# Patient Record
Sex: MALE | Race: WHITE | NOT HISPANIC OR LATINO | Employment: FULL TIME | URBAN - NONMETROPOLITAN AREA
[De-identification: names, ages, dates, MRNs, and addresses within clinical notes are randomized per-mention and may not be internally consistent; named-entity substitution may affect disease eponyms.]

---

## 2017-04-10 RX ORDER — LANSOPRAZOLE 30 MG/1
CAPSULE, DELAYED RELEASE ORAL
Qty: 90 CAPSULE | Refills: 2 | OUTPATIENT
Start: 2017-04-10

## 2017-04-18 ENCOUNTER — OFFICE VISIT (OUTPATIENT)
Dept: CARDIOLOGY | Facility: CLINIC | Age: 52
End: 2017-04-18

## 2017-04-18 VITALS
HEIGHT: 67 IN | SYSTOLIC BLOOD PRESSURE: 120 MMHG | BODY MASS INDEX: 26.06 KG/M2 | DIASTOLIC BLOOD PRESSURE: 76 MMHG | WEIGHT: 166 LBS | HEART RATE: 76 BPM

## 2017-04-18 DIAGNOSIS — Z82.49 FAMILY HISTORY OF HEART DISEASE: ICD-10-CM

## 2017-04-18 DIAGNOSIS — I10 ESSENTIAL HYPERTENSION: ICD-10-CM

## 2017-04-18 DIAGNOSIS — E78.00 HYPERCHOLESTEREMIA: Primary | ICD-10-CM

## 2017-04-18 PROBLEM — K21.9 GERD (GASTROESOPHAGEAL REFLUX DISEASE): Status: ACTIVE | Noted: 2017-04-18

## 2017-04-18 PROCEDURE — 99213 OFFICE O/P EST LOW 20 MIN: CPT | Performed by: NURSE PRACTITIONER

## 2017-04-18 RX ORDER — LOVASTATIN 20 MG/1
20 TABLET ORAL
Qty: 90 TABLET | Refills: 3 | Status: SHIPPED | OUTPATIENT
Start: 2017-04-18 | End: 2017-10-23 | Stop reason: DRUGHIGH

## 2017-04-18 RX ORDER — MULTIPLE VITAMINS W/ MINERALS TAB 9MG-400MCG
1 TAB ORAL DAILY
COMMUNITY

## 2017-04-18 NOTE — PROGRESS NOTES
Chief Complaint   Patient presents with   • Follow-up     6 month follow-up, labs per Dr. Tom, patient brought copy of most recent labs with visit.       Cardiac Complaints  none      Subjective   Bhupendra Rothman is a 51 y.o. male with a history of hypercholesterolemia and strong family history of ischemic heart disease. His stress test in 2015 showed mild septal ischemia and Imdur was added. In April of 2016, he reported more chest tightness with exertion. A repeat stress test did not show definite ischemia. His echocardiogram showed some apical septal hypokinesis and a normal ejection fraction. Today he returns to the office for a follow-up appointment and no complaints are voiced.  Pt reports labs with PCP, most recent brought with him shows liver enzymes well controlled and cholesterol very elevated.  His most recent LDL is 214, he has been intolerant to most statin therapy.  He remains off cigarettes and is now using electronic cigarettes without nicotine.        Cardiac History  Past Surgical History:   Procedure Laterality Date   • CARDIOVASCULAR STRESS TEST  11/16/2012    (Dr. GRANADOS) 5 min 54 sec. 90% THR. Negative   • CARDIOVASCULAR STRESS TEST  02/09/2015    8 min 59 sec, 87% THR, 150/80, septal ischemia, imdur   • CARDIOVASCULAR STRESS TEST  04/06/2016    8 min 31 sec, 164/80, 89% THR, no definite ischemia   • ECHO - CONVERTED  11/14/2012    (Dr. GRANADOS) EF 65%. RVSP 64 mmHg   • ECHO - CONVERTED  02/09/2015    EF 55-60%, apical septal hypokinetic, mild MR, RVSP 43 mmHg   • ECHO - CONVERTED  04/06/2016    EF 55-60%, RVSP 23 mmHg, trace MR, apical septal wall hypokinetic       Current Outpatient Prescriptions   Medication Sig Dispense Refill   • aspirin 81 MG EC tablet Take 81 mg by mouth daily.     • lansoprazole (PREVACID) 30 MG capsule Take 30 mg by mouth daily.     • Loratadine-Pseudoephedrine (CLARITIN-D 24 HOUR PO) Take 1 tablet by mouth Daily As Needed.     • Multiple Vitamins-Minerals (MULTIVITAMIN WITH  "MINERALS) tablet tablet Take 1 tablet by mouth Daily.     • Omega-3 Fatty Acids (FISH OIL) 1200 MG capsule capsule Take 1,200 mg by mouth 2 (two) times a day with meals.     • lovastatin (MEVACOR) 20 MG tablet Take 1 tablet by mouth Daily With Dinner. 90 tablet 3     No current facility-administered medications for this visit.        Crestor [rosuvastatin calcium]    Past Medical History:   Diagnosis Date   • GERD (gastroesophageal reflux disease)    • Hypercholesterolemia    • Hyperlipidemia    • Inactive TB     as a child       Social History     Social History   • Marital status:      Spouse name: N/A   • Number of children: N/A   • Years of education: N/A     Occupational History   • Not on file.     Social History Main Topics   • Smoking status: Former Smoker     Packs/day: 1.50     Years: 30.00     Types: Electronic Cigarette     Quit date: 2013   • Smokeless tobacco: Never Used      Comment: e-cig with no nicotine   • Alcohol use Yes      Comment: 3-4 beers/day   • Drug use: No   • Sexual activity: Not on file     Other Topics Concern   • Not on file     Social History Narrative       Family History   Problem Relation Age of Onset   • Heart disease Mother      PPM/ICD, CABG at age 67, stenting 2 years later   • Heart disease Father      valve replacement   • No Known Problems Sister        Review of Systems   Constitutional: Negative.    HENT: Negative.    Respiratory: Negative.    Cardiovascular: Negative.    Musculoskeletal: Negative.    Skin: Negative.    Neurological: Negative.    Psychiatric/Behavioral: Negative.        DiabetesNo  Thyroidnormal    Objective     /76 (BP Location: Left arm)  Pulse 76  Ht 67\" (170.2 cm)  Wt 166 lb (75.3 kg)  BMI 26 kg/m2    Physical Exam   Constitutional: He is oriented to person, place, and time. He appears well-developed and well-nourished.   HENT:   Head: Normocephalic and atraumatic.   Eyes: EOM are normal. Pupils are equal, round, and reactive to " light.   Neck: Normal range of motion. Neck supple.   Cardiovascular: Normal rate and regular rhythm.    Murmur heard.  Pulmonary/Chest: Effort normal and breath sounds normal.   Abdominal: Soft.   Musculoskeletal: Normal range of motion.   Neurological: He is alert and oriented to person, place, and time.   Skin: Skin is warm and dry.   Psychiatric: He has a normal mood and affect.       Procedures    Assessment/Plan     HR and BP are both stable today.  We will advise to add mevacor to regimen since his most recent LDL of 214 in March.  He was encouraged to have repeat workup with labs 2 months after addition of medication.  No other refills needed.  No new cardiac testing will be advised as no symptoms reported.  Patient encouraged to call with any medication side effects.  Good cardiac diet and activity as tolerated advised.  6 month follow up advised or sooner if needed.    Problems Addressed this Visit        Cardiovascular and Mediastinum    High blood pressure    Hypercholesteremia - Primary    Relevant Medications    lovastatin (MEVACOR) 20 MG tablet    Other Relevant Orders    Comprehensive Metabolic Panel    Lipid Panel       Other    Family history of heart disease                  Electronically signed by ESPERANZA Jaramillo April 18, 2017 2:05 PM

## 2017-10-18 ENCOUNTER — OFFICE VISIT (OUTPATIENT)
Dept: CARDIOLOGY | Facility: CLINIC | Age: 52
End: 2017-10-18

## 2017-10-18 VITALS
DIASTOLIC BLOOD PRESSURE: 84 MMHG | SYSTOLIC BLOOD PRESSURE: 118 MMHG | OXYGEN SATURATION: 97 % | BODY MASS INDEX: 25.01 KG/M2 | HEIGHT: 68 IN | WEIGHT: 165 LBS | HEART RATE: 68 BPM

## 2017-10-18 DIAGNOSIS — Z82.49 FAMILY HISTORY OF HEART DISEASE: ICD-10-CM

## 2017-10-18 DIAGNOSIS — E78.2 MIXED HYPERLIPIDEMIA: ICD-10-CM

## 2017-10-18 DIAGNOSIS — Z79.899 MEDICATION MANAGEMENT: ICD-10-CM

## 2017-10-18 DIAGNOSIS — R73.9 HYPERGLYCEMIA: ICD-10-CM

## 2017-10-18 DIAGNOSIS — E78.00 HYPERCHOLESTEREMIA: ICD-10-CM

## 2017-10-18 DIAGNOSIS — I10 ESSENTIAL HYPERTENSION: Primary | ICD-10-CM

## 2017-10-18 PROCEDURE — 99213 OFFICE O/P EST LOW 20 MIN: CPT | Performed by: NURSE PRACTITIONER

## 2017-10-18 NOTE — PROGRESS NOTES
Chief Complaint   Patient presents with   • Follow-up     Faor cardiac management   • Med Refill     No medication refills at this time...Labs not done       Subjective       Bhupendra Rothman is a 52 y.o. male with a history of hypercholesterolemia and strong family history of ischemic heart disease. His stress test in 2015 showed mild septal ischemia and Imdur was added. In April of 2016, he reported more chest tightness with exertion. A repeat stress test did not show definite ischemia. His echocardiogram showed some apical septal hypokinesis and a normal ejection fraction. He has been intolerant to most statin therapy. At his last office visit Mevacor was recommended.   Today he comes to the office for a follow-up appointment and no cardiac complaints are voiced.  He states he maintains a lot of activity without any significant symptoms.  He is taking Mevacor without significant side effects.    HPI         Cardiac History:    Past Surgical History:   Procedure Laterality Date   • CARDIOVASCULAR STRESS TEST  11/16/2012    (Dr. GRANADOS) 5 min 54 sec. 90% THR. Negative   • CARDIOVASCULAR STRESS TEST  02/09/2015    8 min 59 sec, 87% THR, 150/80, septal ischemia, imdur   • CARDIOVASCULAR STRESS TEST  04/06/2016    8 min 31 sec, 164/80, 89% THR, no definite ischemia   • ECHO - CONVERTED  11/14/2012    (Dr. GRANADOS) EF 65%. RVSP 64 mmHg   • ECHO - CONVERTED  02/09/2015    EF 55-60%, apical septal hypokinetic, mild MR, RVSP 43 mmHg   • ECHO - CONVERTED  04/06/2016    EF 55-60%, RVSP 23 mmHg, trace MR, apical septal wall hypokinetic       Current Outpatient Prescriptions   Medication Sig Dispense Refill   • aspirin 81 MG EC tablet Take 81 mg by mouth daily.     • lansoprazole (PREVACID) 30 MG capsule Take 30 mg by mouth daily.     • Loratadine-Pseudoephedrine (CLARITIN-D 24 HOUR PO) Take 1 tablet by mouth Daily As Needed.     • lovastatin (MEVACOR) 20 MG tablet Take 1 tablet by mouth Daily With Dinner. 90 tablet 3   • Multiple  Vitamins-Minerals (MULTIVITAMIN WITH MINERALS) tablet tablet Take 1 tablet by mouth Daily.     • Omega-3 Fatty Acids (FISH OIL) 1200 MG capsule capsule Take 1,200 mg by mouth 2 (two) times a day with meals.       No current facility-administered medications for this visit.        Crestor [rosuvastatin calcium]    Past Medical History:   Diagnosis Date   • GERD (gastroesophageal reflux disease)    • Hypercholesterolemia    • Hyperlipidemia    • Inactive TB     as a child       Social History     Social History   • Marital status:      Spouse name: N/A   • Number of children: N/A   • Years of education: N/A     Occupational History   • Not on file.     Social History Main Topics   • Smoking status: Former Smoker     Packs/day: 1.50     Years: 30.00     Types: Electronic Cigarette     Quit date: 2013   • Smokeless tobacco: Never Used      Comment: e-cig with no nicotine   • Alcohol use Yes      Comment: 3-4 beers/day   • Drug use: No   • Sexual activity: Not on file     Other Topics Concern   • Not on file     Social History Narrative       Family History   Problem Relation Age of Onset   • Heart disease Mother      PPM/ICD, CABG at age 67, stenting 2 years later   • Heart disease Father      valve replacement   • No Known Problems Sister        Review of Systems   Constitution: Negative for decreased appetite and malaise/fatigue.   HENT: Negative for congestion and sore throat.    Eyes: Negative for blurred vision.   Cardiovascular: Negative for chest pain, claudication, irregular heartbeat, leg swelling, orthopnea, palpitations and paroxysmal nocturnal dyspnea.   Respiratory: Negative for shortness of breath and snoring.    Endocrine: Negative for cold intolerance and heat intolerance.   Hematologic/Lymphatic: Negative for adenopathy. Does not bruise/bleed easily.   Skin: Negative for itching, nail changes and skin cancer.   Musculoskeletal: Negative for arthritis, muscle cramps and myalgias.  "  Gastrointestinal: Negative for abdominal pain, dysphagia, heartburn and melena.   Genitourinary: Negative for frequency and hematuria.   Neurological: Negative for dizziness, headaches, light-headedness, seizures and vertigo.   Psychiatric/Behavioral: Negative for altered mental status. The patient does not have insomnia and is not nervous/anxious.    Allergic/Immunologic: Negative for environmental allergies and hives.        Diabetes- No  Thyroid-normal    Objective     /84 (BP Location: Left arm, Patient Position: Sitting, Cuff Size: Adult)  Pulse 68  Ht 68\" (172.7 cm)  Wt 165 lb (74.8 kg)  SpO2 97%  BMI 25.09 kg/m2    Physical Exam   Constitutional: He is oriented to person, place, and time. He appears well-nourished.   HENT:   Head: Normocephalic.   Eyes: Conjunctivae are normal. Pupils are equal, round, and reactive to light.   Neck: Normal range of motion. Neck supple. Carotid bruit is not present.   Cardiovascular: Normal rate, regular rhythm, S1 normal and S2 normal.    No murmur heard.  Pulmonary/Chest: Breath sounds normal.   Abdominal: Soft. Bowel sounds are normal.   Musculoskeletal: Normal range of motion. He exhibits no edema.   Neurological: He is alert and oriented to person, place, and time.   Skin: Skin is warm and dry.   Psychiatric: He has a normal mood and affect. His behavior is normal. Judgment and thought content normal.      Procedures        Assessment/Plan      Bhupendra was seen today for follow-up and med refill.    Diagnoses and all orders for this visit:    Essential hypertension  -     CBC (No Diff); Future    Hypercholesteremia    Mixed hyperlipidemia    Family history of heart disease  -     Lipid Panel; Future    Medication management  -     Comprehensive Metabolic Panel; Future  -     Lipid Panel; Future  -     CBC (No Diff); Future    Hyperglycemia  -     Lipid Panel; Future  -     Hemoglobin A1c; Future      June lab was reviewed which showed mild increase in glucose " and triglycerides.  His LDL had improved to 146.  Since he has had side effects with higher dosages of statin medication in the past, I did not change Mevacor at this time.  I encouraged him on diabetic low-fat diet.  We will reassess lipid panel, chemistry, blood count and obtain hemoglobin A1c for which a lab order was given.  I have requested a copy also be sent to you.  His blood pressure and heart rate are at goal.  No medication changes made at this time.  We will see him back in 6 months or sooner for problems.             Electronically signed by ESPERANZA Castillo,  October 18, 2017 2:37 PM

## 2017-10-23 ENCOUNTER — TELEPHONE (OUTPATIENT)
Dept: CARDIOLOGY | Facility: CLINIC | Age: 52
End: 2017-10-23

## 2017-10-23 RX ORDER — LOVASTATIN 40 MG/1
40 TABLET ORAL
Qty: 90 TABLET | Refills: 3 | Status: SHIPPED | OUTPATIENT
Start: 2017-10-23 | End: 2019-04-25 | Stop reason: SDUPTHER

## 2017-10-23 NOTE — TELEPHONE ENCOUNTER
----- Message from ESPERANZA Blanchard sent at 10/23/2017 10:31 AM EDT -----  Hs LDL has improved from 146 to 134. He has had issues with statins in the past. Would recommend increasing dose of Mevacor to 40 mg daily. He can try adding Co enzyme Q 10 200 mg daily to see if help prevent myalgia. His triglycerides are also elevated being 263. Try low triglyceride diet and using coconut oil first, if remains elevated will consider fibrate. Thanks

## 2017-10-23 NOTE — TELEPHONE ENCOUNTER
Patient notified of lab results and recommendation's: see Result note. Mevacor 40mg daily 90 day refill's sent into pharmacy.

## 2017-12-11 ENCOUNTER — TELEPHONE (OUTPATIENT)
Dept: CARDIOLOGY | Facility: CLINIC | Age: 52
End: 2017-12-11

## 2017-12-11 NOTE — TELEPHONE ENCOUNTER
Recommend add low dose Lisinopril 10 mg daily. Also script for nitrostat prn chest pain. Recheck BP in couple weeks. Advise to call sooner for problems.

## 2017-12-11 NOTE — TELEPHONE ENCOUNTER
Pt came by asking for BP to be checked.  Stated he had an episode this morning of back pain  That radiated thru to his chest, SOB noted. BP at the time was 170 /110.  Symptoms resolved after taking 2-3 ASA's 81 mg .  Pt was completely asymptomatic when he arrived here.  Was just concerned his BP had been elevated.  BP here was 140/96. Advised him to go to ER if symptoms reoccur. Last stress and echo 4/6/16.

## 2017-12-12 RX ORDER — NITROGLYCERIN 0.4 MG/1
TABLET SUBLINGUAL
Qty: 25 TABLET | Refills: 2 | Status: SHIPPED | OUTPATIENT
Start: 2017-12-12

## 2017-12-12 RX ORDER — LISINOPRIL 10 MG/1
10 TABLET ORAL DAILY
Qty: 90 TABLET | Refills: 3 | Status: SHIPPED | OUTPATIENT
Start: 2017-12-12 | End: 2018-04-19

## 2017-12-12 NOTE — TELEPHONE ENCOUNTER
Spoke with pt, states BP went down to normal yesterday, is wanting to monitor BP closely for a few days before starting the Lisinopril. Pt aware that Lisinopril and Nitro will be called into the pharmacy. Will  nitro and use with any more symptoms, wants to hold off on picking up the Lisinopril, will call with progress report in a few days.

## 2017-12-14 NOTE — TELEPHONE ENCOUNTER
Pt called back, decided to start the Lisinopril 10 mg daily. BP running 120's -130/80's. Doing good. Scheduled a BP check for Jan 4th, pt aware.

## 2018-01-04 ENCOUNTER — TELEPHONE (OUTPATIENT)
Dept: CARDIOLOGY | Facility: CLINIC | Age: 53
End: 2018-01-04

## 2018-01-04 ENCOUNTER — CLINICAL SUPPORT (OUTPATIENT)
Dept: CARDIOLOGY | Facility: CLINIC | Age: 53
End: 2018-01-04

## 2018-01-04 VITALS — SYSTOLIC BLOOD PRESSURE: 116 MMHG | DIASTOLIC BLOOD PRESSURE: 86 MMHG | HEART RATE: 98 BPM

## 2018-01-04 DIAGNOSIS — Z01.30 BLOOD PRESSURE CHECK: Primary | ICD-10-CM

## 2018-01-04 NOTE — TELEPHONE ENCOUNTER
Patient was in this morning for BP check. Lisinopril 10 mg was started on 12/14/17.    /86  HR 98.  Stated he was doing fine with the Lisinopril, an occasional cough that lasts about 30 seconds.

## 2018-04-19 ENCOUNTER — OFFICE VISIT (OUTPATIENT)
Dept: CARDIOLOGY | Facility: CLINIC | Age: 53
End: 2018-04-19

## 2018-04-19 VITALS
BODY MASS INDEX: 25.31 KG/M2 | DIASTOLIC BLOOD PRESSURE: 90 MMHG | HEART RATE: 72 BPM | WEIGHT: 167 LBS | SYSTOLIC BLOOD PRESSURE: 120 MMHG | HEIGHT: 68 IN

## 2018-04-19 DIAGNOSIS — Z87.891 FORMER SMOKER, STOPPED SMOKING IN DISTANT PAST: ICD-10-CM

## 2018-04-19 DIAGNOSIS — E78.00 HYPERCHOLESTEREMIA: ICD-10-CM

## 2018-04-19 DIAGNOSIS — I10 ESSENTIAL HYPERTENSION: Primary | ICD-10-CM

## 2018-04-19 DIAGNOSIS — Z82.49 FAMILY HISTORY OF HEART DISEASE: ICD-10-CM

## 2018-04-19 DIAGNOSIS — Z79.899 MEDICATION MANAGEMENT: ICD-10-CM

## 2018-04-19 DIAGNOSIS — R05.9 COUGH: ICD-10-CM

## 2018-04-19 PROCEDURE — 99213 OFFICE O/P EST LOW 20 MIN: CPT | Performed by: NURSE PRACTITIONER

## 2018-04-19 RX ORDER — LOSARTAN POTASSIUM 25 MG/1
25 TABLET ORAL DAILY
Qty: 90 TABLET | Refills: 3 | Status: SHIPPED | OUTPATIENT
Start: 2018-04-19 | End: 2018-10-25 | Stop reason: SDUPTHER

## 2018-04-19 NOTE — PROGRESS NOTES
Chief Complaint   Patient presents with   • Follow-up     cardiac management, patient brought copy of most recent labs with visit.    • Med Refill     Patient want's to change b/p medication Lisinopril due to cough.        Subjective       Bhupendra Rothman is a 52 y.o. male with a history of hypercholesterolemia and strong family history of ischemic heart disease. His stress test in 2015 showed mild septal ischemia and Imdur was added. In April of 2016, he reported more chest tightness with exertion. A repeat stress test did not show definite ischemia. His echocardiogram showed some apical septal hypokinesis and a normal ejection fraction. He has been intolerant to most statin therapy. Mevacor was last statin recommended and he tolerated low dose, therefore the dose was increase in 2017. In December 2017, he called the office to report episode of increased blood pressure. Lisinopril was added.   Today he comes to the office for a follow up visit. He denies angina type chest pain or palpitations. He states he maintain strenuous activities with appropriate shortness of breat. At times he has chest soreness. He is concerned over persistent dry cough.     HPI     Cardiac History:    Past Surgical History:   Procedure Laterality Date   • CARDIOVASCULAR STRESS TEST  11/16/2012    (Dr. GRANADOS) 5 min 54 sec. 90% THR. Negative   • CARDIOVASCULAR STRESS TEST  02/09/2015    8 min 59 sec, 87% THR, 150/80, septal ischemia, imdur   • CARDIOVASCULAR STRESS TEST  04/06/2016    8 min 31 sec, 164/80, 89% THR, no definite ischemia   • ECHO - CONVERTED  11/14/2012    (Dr. GRANADOS) EF 65%. RVSP 64 mmHg   • ECHO - CONVERTED  02/09/2015    EF 55-60%, apical septal hypokinetic, mild MR, RVSP 43 mmHg   • ECHO - CONVERTED  04/06/2016    EF 55-60%, RVSP 23 mmHg, trace MR, apical septal wall hypokinetic       Current Outpatient Prescriptions   Medication Sig Dispense Refill   • aspirin 81 MG EC tablet Take 81 mg by mouth daily.     • lansoprazole  (PREVACID) 30 MG capsule Take 30 mg by mouth daily.     • Loratadine-Pseudoephedrine (CLARITIN-D 24 HOUR PO) Take 1 tablet by mouth Daily As Needed.     • lovastatin (MEVACOR) 40 MG tablet Take 1 tablet by mouth Daily With Dinner. (Patient taking differently: Takes 2 tablet's by mouth every evening.) 90 tablet 3   • Multiple Vitamins-Minerals (MULTIVITAMIN WITH MINERALS) tablet tablet Take 1 tablet by mouth Daily.     • nitroglycerin (NITROSTAT) 0.4 MG SL tablet 1 under the tongue as needed for angina, may repeat q5mins for up three doses 25 tablet 2   • Omega-3 Fatty Acids (FISH OIL) 1200 MG capsule capsule Take 1,200 mg by mouth 2 (two) times a day with meals.     • losartan (COZAAR) 25 MG tablet Take 1 tablet by mouth Daily. 90 tablet 3     No current facility-administered medications for this visit.        Crestor [rosuvastatin calcium]    Past Medical History:   Diagnosis Date   • GERD (gastroesophageal reflux disease)    • Hypercholesterolemia    • Hyperlipidemia    • Inactive TB     as a child       Social History     Social History   • Marital status:      Spouse name: N/A   • Number of children: N/A   • Years of education: N/A     Occupational History   • Not on file.     Social History Main Topics   • Smoking status: Former Smoker     Packs/day: 1.50     Years: 30.00     Types: Electronic Cigarette     Quit date: 2013   • Smokeless tobacco: Never Used      Comment: e-cig with no nicotine   • Alcohol use Yes      Comment: 3-4 beers/day   • Drug use: No   • Sexual activity: Defer     Other Topics Concern   • Not on file     Social History Narrative   • No narrative on file       Family History   Problem Relation Age of Onset   • Heart disease Mother      PPM/ICD, CABG at age 67, stenting 2 years later   • Heart disease Father      valve replacement   • No Known Problems Sister        Review of Systems   Constitution: Negative for decreased appetite and malaise/fatigue.   HENT: Negative for congestion,  "hoarse voice, nosebleeds and sore throat.    Eyes: Negative for blurred vision.   Cardiovascular: Negative for chest pain, dyspnea on exertion, irregular heartbeat, leg swelling, near-syncope and palpitations.   Respiratory: Positive for cough. Negative for shortness of breath and snoring.    Endocrine: Negative for cold intolerance and heat intolerance.   Hematologic/Lymphatic: Negative for adenopathy. Does not bruise/bleed easily.   Skin: Negative for color change, dry skin and itching.   Musculoskeletal: Negative for joint pain, muscle cramps and myalgias.   Gastrointestinal: Negative for abdominal pain, change in bowel habit, dysphagia, heartburn, melena and nausea.   Genitourinary: Negative for dysuria and hematuria.   Neurological: Negative for dizziness and light-headedness.   Psychiatric/Behavioral: Negative for altered mental status. The patient does not have insomnia.    Allergic/Immunologic: Negative for environmental allergies and hives.        Objective     /90 (BP Location: Left arm)   Pulse 72   Ht 172.7 cm (68\")   Wt 75.8 kg (167 lb)   BMI 25.39 kg/m²     Physical Exam   Constitutional: He is oriented to person, place, and time. He appears well-nourished.   HENT:   Head: Normocephalic.   Eyes: Pupils are equal, round, and reactive to light.   Neck: Normal range of motion.   Cardiovascular: Normal rate, regular rhythm, S1 normal and S2 normal.    No murmur heard.  Pulmonary/Chest: Breath sounds normal.   Abdominal: Soft. Bowel sounds are normal.   Musculoskeletal: Normal range of motion.   Neurological: He is alert and oriented to person, place, and time.   Skin: Skin is warm.   Psychiatric: He has a normal mood and affect.        Procedures: none today        Assessment/Plan      Bhupendra was seen today for follow-up and med refill.    Diagnoses and all orders for this visit:    Essential hypertension    Hypercholesteremia    Family history of heart disease    Cough    Medication " management    Former smoker, stopped smoking in distant past    Other orders  -     losartan (COZAAR) 25 MG tablet; Take 1 tablet by mouth Daily.    His blood pressure is better controlled. Due to cough advised to change Lisinopril to Losartan. BP monitor sheet given. Hypertension handout also given. Body mass index is 25.39 kg/m². Patient's Body mass index is 25.39 kg/m². BMI is slightly above normal parameters. Follow-up plan includes:  nutrition counseling. Heart healthy diet encouraged. Recent lab report shows normal LFT, , triglycerides 234, HDL 54 and - improved with high dose Mevacor. He appears to be tolerating current dose of statin without problems and advised to continue the same. He follows with you for management of labs. He reports nitrostat is up to date. Information how to use for chest pain was given to him.   We will plan to see him back in 6 months, sooner for problems.              Electronically signed by ESPERANZA Castillo,  April 20, 2018 2:09 PM

## 2018-04-19 NOTE — PATIENT INSTRUCTIONS
Nitroglycerin sublingual tablets  What is this medicine?  NITROGLYCERIN (kun troe GLI ser in) is a type of vasodilator. It relaxes blood vessels, increasing the blood and oxygen supply to your heart. This medicine is used to relieve chest pain caused by angina. It is also used to prevent chest pain before activities like climbing stairs, going outdoors in cold weather, or sexual activity.  This medicine may be used for other purposes; ask your health care provider or pharmacist if you have questions.  COMMON BRAND NAME(S): Nitroquick, Nitrostat, Nitrotab  What should I tell my health care provider before I take this medicine?  They need to know if you have any of these conditions:  -anemia  -head injury, recent stroke, or bleeding in the brain  -liver disease  -previous heart attack  -an unusual or allergic reaction to nitroglycerin, other medicines, foods, dyes, or preservatives  -pregnant or trying to get pregnant  -breast-feeding  How should I use this medicine?  Take this medicine by mouth as needed. At the first sign of an angina attack (chest pain or tightness) place one tablet under your tongue. You can also take this medicine 5 to 10 minutes before an event likely to produce chest pain. Follow the directions on the prescription label. Let the tablet dissolve under the tongue. Do not swallow whole. Replace the dose if you accidentally swallow it. It will help if your mouth is not dry. Saliva around the tablet will help it to dissolve more quickly. Do not eat or drink, smoke or chew tobacco while a tablet is dissolving. If you are not better within 5 minutes after taking ONE dose of nitroglycerin, call 9-1-1 immediately to seek emergency medical care. Do not take more than 3 nitroglycerin tablets over 15 minutes.  If you take this medicine often to relieve symptoms of angina, your doctor or health care professional may provide you with different instructions to manage your symptoms. If symptoms do not go away  after following these instructions, it is important to call 9-1-1 immediately. Do not take more than 3 nitroglycerin tablets over 15 minutes.  Talk to your pediatrician regarding the use of this medicine in children. Special care may be needed.  Overdosage: If you think you have taken too much of this medicine contact a poison control center or emergency room at once.  NOTE: This medicine is only for you. Do not share this medicine with others.  What if I miss a dose?  This does not apply. This medicine is only used as needed.  What may interact with this medicine?  Do not take this medicine with any of the following medications:  -certain migraine medicines like ergotamine and dihydroergotamine (DHE)  -medicines used to treat erectile dysfunction like sildenafil, tadalafil, and vardenafil  -riociguat  This medicine may also interact with the following medications:  -alteplase  -aspirin  -heparin  -medicines for high blood pressure  -medicines for mental depression  -other medicines used to treat angina  -phenothiazines like chlorpromazine, mesoridazine, prochlorperazine, thioridazine  This list may not describe all possible interactions. Give your health care provider a list of all the medicines, herbs, non-prescription drugs, or dietary supplements you use. Also tell them if you smoke, drink alcohol, or use illegal drugs. Some items may interact with your medicine.  What should I watch for while using this medicine?  Tell your doctor or health care professional if you feel your medicine is no longer working.  Keep this medicine with you at all times. Sit or lie down when you take your medicine to prevent falling if you feel dizzy or faint after using it. Try to remain calm. This will help you to feel better faster. If you feel dizzy, take several deep breaths and lie down with your feet propped up, or bend forward with your head resting between your knees.  You may get drowsy or dizzy. Do not drive, use machinery,  or do anything that needs mental alertness until you know how this drug affects you. Do not stand or sit up quickly, especially if you are an older patient. This reduces the risk of dizzy or fainting spells. Alcohol can make you more drowsy and dizzy. Avoid alcoholic drinks.  Do not treat yourself for coughs, colds, or pain while you are taking this medicine without asking your doctor or health care professional for advice. Some ingredients may increase your blood pressure.  What side effects may I notice from receiving this medicine?  Side effects that you should report to your doctor or health care professional as soon as possible:  -blurred vision  -dry mouth  -skin rash  -sweating  -the feeling of extreme pressure in the head  -unusually weak or tired  Side effects that usually do not require medical attention (report to your doctor or health care professional if they continue or are bothersome):  -flushing of the face or neck  -headache  -irregular heartbeat, palpitations  -nausea, vomiting  This list may not describe all possible side effects. Call your doctor for medical advice about side effects. You may report side effects to FDA at 7-827-FDA-0297.  Where should I keep my medicine?  Keep out of the reach of children.  Store at room temperature between 20 and 25 degrees C (68 and 77 degrees F). Store in original container. Protect from light and moisture. Keep tightly closed. Throw away any unused medicine after the expiration date.  NOTE: This sheet is a summary. It may not cover all possible information. If you have questions about this medicine, talk to your doctor, pharmacist, or health care provider.  © 2018 [employer]vier/Gold Standard (2014-10-16 17:57:36)

## 2018-10-25 ENCOUNTER — OFFICE VISIT (OUTPATIENT)
Dept: CARDIOLOGY | Facility: CLINIC | Age: 53
End: 2018-10-25

## 2018-10-25 VITALS
WEIGHT: 160 LBS | BODY MASS INDEX: 24.25 KG/M2 | SYSTOLIC BLOOD PRESSURE: 110 MMHG | HEART RATE: 72 BPM | DIASTOLIC BLOOD PRESSURE: 80 MMHG | HEIGHT: 68 IN

## 2018-10-25 DIAGNOSIS — Z82.49 FAMILY HISTORY OF HEART DISEASE: ICD-10-CM

## 2018-10-25 DIAGNOSIS — I10 ESSENTIAL HYPERTENSION: Primary | ICD-10-CM

## 2018-10-25 DIAGNOSIS — E78.00 HYPERCHOLESTEREMIA: ICD-10-CM

## 2018-10-25 DIAGNOSIS — K21.9 GASTROESOPHAGEAL REFLUX DISEASE WITHOUT ESOPHAGITIS: ICD-10-CM

## 2018-10-25 PROCEDURE — 99213 OFFICE O/P EST LOW 20 MIN: CPT | Performed by: NURSE PRACTITIONER

## 2018-10-25 RX ORDER — LOSARTAN POTASSIUM 25 MG/1
25 TABLET ORAL DAILY
Qty: 90 TABLET | Refills: 3 | Status: SHIPPED | OUTPATIENT
Start: 2018-10-25 | End: 2019-03-18 | Stop reason: ALTCHOICE

## 2018-10-25 RX ORDER — FENOFIBRIC ACID 135 MG/1
135 CAPSULE, DELAYED RELEASE ORAL DAILY
COMMUNITY
End: 2019-04-25 | Stop reason: SDUPTHER

## 2018-10-25 RX ORDER — SULFAMETHOXAZOLE AND TRIMETHOPRIM 800; 160 MG/1; MG/1
1 TABLET ORAL 2 TIMES DAILY
Qty: 20 TABLET | Refills: 0 | Status: SHIPPED | OUTPATIENT
Start: 2018-10-25 | End: 2019-04-25 | Stop reason: ALTCHOICE

## 2018-10-25 NOTE — PROGRESS NOTES
Chief Complaint   Patient presents with   • Follow-up     For cardiac management. Patient is on aspirin. Last lab work was done a couple of months ago per PCP, not in chart. Triglycerides were high so Trilipix.   • Med Refill     Needs refills on losartan. 90 day supplies to [facility] in [place].        Cardiac Complaints  none      Subjective   Bhupendra Rothman is a 53 y.o. male with hypercholesterolemia, HTN,  and strong family history of ischemic heart disease. His stress test in 2015 showed mild septal ischemia and Imdur was added. In April of 2016, he reported more chest tightness with exertion. A repeat stress test did not show definite ischemia. His echocardiogram showed some apical septal hypokinesis and a normal ejection fraction. He had been statin intolerant but has tolerated Mevacor well. In December 2017, he called the office to report episode of increased blood pressure. Lisinopril was added.  He returns today for follow up and denies any cardiac concerns.  He does report with blood pressure better controlled cardiac symptoms are denied.  He does report an issue with a pimple like area on his waist he is concerned with and reports his son was battling some staph about a week ago.  Labs he admits are followed by PCP and were done a few month ago. Patient does report triglycerides as elevated and states trilipix was started at that time.  Refills of losartan therapy requested for 90 day supply.      Cardiac History  Past Surgical History:   Procedure Laterality Date   • CARDIOVASCULAR STRESS TEST  11/16/2012    (Dr. GRANADOS) 5 min 54 sec. 90% THR. Negative   • CARDIOVASCULAR STRESS TEST  02/09/2015    8 min 59 sec, 87% THR, 150/80, septal ischemia, imdur   • CARDIOVASCULAR STRESS TEST  04/06/2016    8 min 31 sec, 164/80, 89% THR, no definite ischemia   • ECHO - CONVERTED  11/14/2012    (Dr. GRANADOS) EF 65%. RVSP 64 mmHg   • ECHO - CONVERTED  02/09/2015    EF 55-60%, apical septal hypokinetic, mild MR, RVSP 43  mmHg   • ECHO - CONVERTED  04/06/2016    EF 55-60%, RVSP 23 mmHg, trace MR, apical septal wall hypokinetic       Current Outpatient Prescriptions   Medication Sig Dispense Refill   • aspirin 81 MG EC tablet Take 81 mg by mouth daily.     • fenofibric acid (TRILIPIX) 135 MG capsule delayed-release delayed release capsule Take 135 mg by mouth Daily.     • lansoprazole (PREVACID) 30 MG capsule Take 30 mg by mouth daily.     • Loratadine-Pseudoephedrine (CLARITIN-D 24 HOUR PO) Take 1 tablet by mouth Daily As Needed.     • losartan (COZAAR) 25 MG tablet Take 1 tablet by mouth Daily. 90 tablet 3   • lovastatin (MEVACOR) 40 MG tablet Take 1 tablet by mouth Daily With Dinner. (Patient taking differently: Takes 2 tablet's by mouth every evening.) 90 tablet 3   • Multiple Vitamins-Minerals (MULTIVITAMIN WITH MINERALS) tablet tablet Take 1 tablet by mouth Daily.     • nitroglycerin (NITROSTAT) 0.4 MG SL tablet 1 under the tongue as needed for angina, may repeat q5mins for up three doses 25 tablet 2   • Omega-3 Fatty Acids (FISH OIL) 1200 MG capsule capsule Take 1,200 mg by mouth 2 (two) times a day with meals.     • sulfamethoxazole-trimethoprim (BACTRIM DS) 800-160 MG per tablet Take 1 tablet by mouth 2 (Two) Times a Day. 20 tablet 0     No current facility-administered medications for this visit.        Crestor [rosuvastatin calcium]    Past Medical History:   Diagnosis Date   • GERD (gastroesophageal reflux disease)    • Hypercholesterolemia    • Hyperlipidemia    • Inactive TB     as a child       Social History     Social History   • Marital status:      Spouse name: N/A   • Number of children: N/A   • Years of education: N/A     Occupational History   • Not on file.     Social History Main Topics   • Smoking status: Former Smoker     Packs/day: 1.50     Years: 30.00     Types: Electronic Cigarette     Quit date: 2013   • Smokeless tobacco: Never Used      Comment: e-cig with no nicotine   • Alcohol use Yes       "Comment: 3-4 beers/day   • Drug use: No   • Sexual activity: Defer     Other Topics Concern   • Not on file     Social History Narrative   • No narrative on file       Family History   Problem Relation Age of Onset   • Heart disease Mother         PPM/ICD, CABG at age 67, stenting 2 years later   • Heart disease Father         valve replacement   • No Known Problems Sister        Review of Systems   Constitution: Negative for weakness and malaise/fatigue.   Cardiovascular: Negative for chest pain, claudication, dyspnea on exertion, irregular heartbeat, near-syncope, orthopnea, palpitations and syncope.   Respiratory: Negative for cough, shortness of breath and wheezing.    Musculoskeletal: Negative for back pain, joint pain and joint swelling.   Gastrointestinal: Negative for anorexia, heartburn, nausea and vomiting.   Genitourinary: Negative for dysuria, hematuria, hesitancy and nocturia.   Neurological: Negative for dizziness, light-headedness and loss of balance.   Psychiatric/Behavioral: Negative for altered mental status, depression and memory loss. The patient is not nervous/anxious.            Objective     /80 (BP Location: Left arm)   Pulse 72   Ht 172.7 cm (67.99\")   Wt 72.6 kg (160 lb)   BMI 24.33 kg/m²     Physical Exam   Constitutional: He is oriented to person, place, and time. He appears well-developed and well-nourished.   HENT:   Head: Normocephalic and atraumatic.   Eyes: Pupils are equal, round, and reactive to light. EOM are normal.   Neck: Normal range of motion. Neck supple.   Cardiovascular: Normal rate and regular rhythm.    Murmur heard.  Pulmonary/Chest: Effort normal and breath sounds normal.   Abdominal: Soft.   Musculoskeletal: Normal range of motion.   Neurological: He is alert and oriented to person, place, and time.   Skin: Skin is warm and dry.   Psychiatric: He has a normal mood and affect. His behavior is normal.       Procedures    Assessment/Plan     HR and BP are " stable today.  HTN is well managed on current regimen, no adjustment to current regimen.  Continued DASH diet encouraged.  For lipid management, he has continued on trilipix for hypertriglyceridemia and mevacor for elevated LDL.  He does admit to tolerating both well.  Labs he reports with your office.  Could we have next for review?  No new cardiac workup will be advised today as no new concerns are voiced.  If symptoms should arise, patient aware to call so further workup may be considered.  BMI stable at 24.33 and patient admits to an active lifestyle with running and walking and has been watching his carbs more than prior.  6 month follow up advised or sooner if needed.        Problems Addressed this Visit        Cardiovascular and Mediastinum    High blood pressure - Primary    Relevant Medications    losartan (COZAAR) 25 MG tablet    Hypercholesteremia    Relevant Medications    fenofibric acid (TRILIPIX) 135 MG capsule delayed-release delayed release capsule       Digestive    GERD (gastroesophageal reflux disease)       Other    Family history of heart disease          Patient's Body mass index is 24.33 kg/m². BMI is within normal parameters. No follow-up required.            Electronically signed by ESPERANZA Jaramillo October 25, 2018 2:51 PM

## 2019-03-18 ENCOUNTER — TELEPHONE (OUTPATIENT)
Dept: CARDIOLOGY | Facility: CLINIC | Age: 54
End: 2019-03-18

## 2019-03-18 RX ORDER — OLMESARTAN MEDOXOMIL 20 MG/1
10 TABLET ORAL DAILY
Qty: 15 TABLET | Refills: 11 | Status: SHIPPED | OUTPATIENT
Start: 2019-03-18 | End: 2019-03-19 | Stop reason: ALTCHOICE

## 2019-03-18 NOTE — TELEPHONE ENCOUNTER
Patient was made aware of changing from losartan to Benicar to 20 mg 1/2 tablet QD. Script sent to [facility] in [place].

## 2019-03-18 NOTE — TELEPHONE ENCOUNTER
Patient reports that he got a letter stating that his losartan 25 mg QD has been recalled and is needing to be changed. He reports that that his blood pressure has been doing good for him at home and has been wondering if he needs to continue taking anything. But states that he has been taking up until now.

## 2019-03-19 ENCOUNTER — TELEPHONE (OUTPATIENT)
Dept: CARDIOLOGY | Facility: CLINIC | Age: 54
End: 2019-03-19

## 2019-03-19 RX ORDER — CANDESARTAN 8 MG/1
8 TABLET ORAL DAILY
Qty: 30 TABLET | Refills: 11 | Status: SHIPPED | OUTPATIENT
Start: 2019-03-19 | End: 2019-03-20 | Stop reason: ALTCHOICE

## 2019-03-19 NOTE — TELEPHONE ENCOUNTER
Patient was made aware of order for candesartan 8 mg QD. Script sent to [facility] in [place] with 30 tablets and 11 refills.

## 2019-03-19 NOTE — TELEPHONE ENCOUNTER
[facility] called stating that olmesartan 20 mg 1/2 tablet QD is on back order at this time and is asking what you would recommend in its place?

## 2019-03-20 RX ORDER — VALSARTAN 40 MG/1
40 TABLET ORAL DAILY
Qty: 30 TABLET | Refills: 11 | Status: SHIPPED | OUTPATIENT
Start: 2019-03-20 | End: 2019-04-25 | Stop reason: SDUPTHER

## 2019-03-20 NOTE — TELEPHONE ENCOUNTER
Wil from [facility] called and reported that they are also currently on back order of candesartan. He reports that they do have valsartan and telmisartan. What do you recommend?

## 2019-03-20 NOTE — TELEPHONE ENCOUNTER
Patient was made aware of script for valsartan 40 mg QD. Script sent to [facility] in [place] with 30 tablets and 11 refills.

## 2019-04-25 ENCOUNTER — OFFICE VISIT (OUTPATIENT)
Dept: CARDIOLOGY | Facility: CLINIC | Age: 54
End: 2019-04-25

## 2019-04-25 VITALS
HEIGHT: 68 IN | HEART RATE: 83 BPM | DIASTOLIC BLOOD PRESSURE: 78 MMHG | BODY MASS INDEX: 24.34 KG/M2 | SYSTOLIC BLOOD PRESSURE: 122 MMHG

## 2019-04-25 DIAGNOSIS — I10 ESSENTIAL HYPERTENSION: Primary | ICD-10-CM

## 2019-04-25 DIAGNOSIS — E78.00 HYPERCHOLESTEREMIA: ICD-10-CM

## 2019-04-25 PROCEDURE — 93000 ELECTROCARDIOGRAM COMPLETE: CPT | Performed by: NURSE PRACTITIONER

## 2019-04-25 PROCEDURE — 99213 OFFICE O/P EST LOW 20 MIN: CPT | Performed by: NURSE PRACTITIONER

## 2019-04-25 RX ORDER — LOVASTATIN 40 MG/1
TABLET ORAL
Qty: 180 TABLET | Refills: 3 | Status: SHIPPED | OUTPATIENT
Start: 2019-04-25 | End: 2019-10-24 | Stop reason: ALTCHOICE

## 2019-04-25 RX ORDER — VALSARTAN 40 MG/1
40 TABLET ORAL DAILY
Qty: 90 TABLET | Refills: 2 | Status: SHIPPED | OUTPATIENT
Start: 2019-04-25 | End: 2019-10-24 | Stop reason: SDUPTHER

## 2019-04-25 RX ORDER — FENOFIBRIC ACID 135 MG/1
135 CAPSULE, DELAYED RELEASE ORAL DAILY
Qty: 90 CAPSULE | Refills: 2 | Status: SHIPPED | OUTPATIENT
Start: 2019-04-25 | End: 2019-10-24 | Stop reason: SDUPTHER

## 2019-04-25 NOTE — PROGRESS NOTES
Chief Complaint   Patient presents with   • Follow-up     cardiac management   • Aspirin     81 mg daily dose   • Med Refill     90 daysupply of Diovan,Lovastatin,Triplex,and Prevacid       Cardiac Complaints  none      Subjective   Bhupendra Rothman is a 53 y.o. male with hypercholesterolemia, HTN,  and strong family history of ischemic heart disease. His stress test in 2015 showed mild septal ischemia and Imdur was added. In April of 2016, he reported more chest tightness with exertion. A repeat stress test did not show definite ischemia. His echocardiogram showed some apical septal hypokinesis and a normal ejection fraction. He had been statin intolerant but has tolerated Mevacor well. In December 2017, he called the office to report episode of increased blood pressure. Lisinopril was added. Patient returns today for follow up and cardiac concerns are denied.  He reports a very active lifestyle at home without concerns.  Chest pain, shortness of breath, palpitations, and dizziness are denied.  He does report labs a few months ago with PCP but can not recall when they were done.  No current available for review.  Cardiac refills requested.        Cardiac History  Past Surgical History:   Procedure Laterality Date   • CARDIOVASCULAR STRESS TEST  11/16/2012    (Dr. GRANADOS) 5 min 54 sec. 90% THR. Negative   • CARDIOVASCULAR STRESS TEST  02/09/2015    8 min 59 sec, 87% THR, 150/80, septal ischemia, imdur   • CARDIOVASCULAR STRESS TEST  04/06/2016    8 min 31 sec, 164/80, 89% THR, no definite ischemia   • ECHO - CONVERTED  11/14/2012    (Dr. GRANADOS) EF 65%. RVSP 64 mmHg   • ECHO - CONVERTED  02/09/2015    EF 55-60%, apical septal hypokinetic, mild MR, RVSP 43 mmHg   • ECHO - CONVERTED  04/06/2016    EF 55-60%, RVSP 23 mmHg, trace MR, apical septal wall hypokinetic       Current Outpatient Medications   Medication Sig Dispense Refill   • aspirin 81 MG EC tablet Take 81 mg by mouth daily.     • fenofibric acid (TRILIPIX) 135 MG  capsule delayed-release delayed release capsule Take 1 capsule by mouth Daily. 90 capsule 2   • lansoprazole (PREVACID) 30 MG capsule Take 30 mg by mouth daily.     • Loratadine-Pseudoephedrine (CLARITIN-D 24 HOUR PO) Take 1 tablet by mouth Daily As Needed.     • lovastatin (MEVACOR) 40 MG tablet Takes 2 tablet's by mouth every evening. 180 tablet 3   • Multiple Vitamins-Minerals (MULTIVITAMIN WITH MINERALS) tablet tablet Take 1 tablet by mouth Daily.     • nitroglycerin (NITROSTAT) 0.4 MG SL tablet 1 under the tongue as needed for angina, may repeat q5mins for up three doses 25 tablet 2   • Omega-3 Fatty Acids (FISH OIL) 1200 MG capsule capsule Take 1,200 mg by mouth 2 (two) times a day with meals.     • valsartan (DIOVAN) 40 MG tablet Take 1 tablet by mouth Daily. 90 tablet 2     No current facility-administered medications for this visit.        Crestor [rosuvastatin calcium]    Past Medical History:   Diagnosis Date   • GERD (gastroesophageal reflux disease)    • Hypercholesterolemia    • Hyperlipidemia    • Inactive TB     as a child       Social History     Socioeconomic History   • Marital status:      Spouse name: Not on file   • Number of children: Not on file   • Years of education: Not on file   • Highest education level: Not on file   Tobacco Use   • Smoking status: Former Smoker     Packs/day: 1.50     Years: 30.00     Pack years: 45.00     Types: Electronic Cigarette     Last attempt to quit: 2013     Years since quittin.3   • Smokeless tobacco: Never Used   • Tobacco comment: e-cig with no nicotine   Substance and Sexual Activity   • Alcohol use: Yes     Comment: 3-4 beers/day   • Drug use: No   • Sexual activity: Defer       Family History   Problem Relation Age of Onset   • Heart disease Mother         PPM/ICD, CABG at age 67, stenting 2 years later   • Heart disease Father         valve replacement   • No Known Problems Sister        Review of Systems   Constitution: Negative for  "weakness and malaise/fatigue.   Cardiovascular: Negative for chest pain, claudication, dyspnea on exertion, irregular heartbeat, orthopnea and palpitations.   Respiratory: Negative for cough, shortness of breath and wheezing.    Musculoskeletal: Negative for back pain, joint pain and joint swelling.   Gastrointestinal: Negative for anorexia, heartburn, nausea and vomiting.   Genitourinary: Negative for dysuria, hematuria, hesitancy and nocturia.   Neurological: Negative for dizziness, light-headedness and loss of balance.   Psychiatric/Behavioral: Negative for depression and memory loss. The patient is not nervous/anxious.            Objective     /78 (BP Location: Left arm)   Pulse 83   Ht 172.7 cm (67.99\")   BMI 24.34 kg/m²     Physical Exam   Constitutional: He is oriented to person, place, and time. He appears well-developed and well-nourished.   HENT:   Head: Normocephalic and atraumatic.   Eyes: EOM are normal. Pupils are equal, round, and reactive to light.   Neck: Normal range of motion. Neck supple.   Cardiovascular: Normal rate and regular rhythm.   Murmur heard.  Pulmonary/Chest: Effort normal and breath sounds normal.   Abdominal: Soft.   Musculoskeletal: Normal range of motion.   Neurological: He is alert and oriented to person, place, and time.   Skin: Skin is warm and dry.   Psychiatric: He has a normal mood and affect. His behavior is normal.         ECG 12 Lead  Date/Time: 4/25/2019 11:05 AM  Performed by: Leslie Villanueva APRN  Authorized by: Leslie Villanueva APRN   Rhythm: sinus rhythm  BPM: 83    Clinical impression: normal ECG            Assessment/Plan     HR is stable today and regular.  EKG done today for history of HTN and abnormal stress in the past showed normal sinus rhythm with normal QT.  HTN well managed, with current regimen continued.  He has continued on mevacor therapy, fish oil, and trilipix therapy for mixed hyperlipidemia management and tolerates well.  No recent labs " are available to assess efficacy but he states you are following in regards.  Can we have most recent copy for our records?  For now, current continued.  No new workup recommended as no new concerns are voiced and cardiac status appears stable.  BMI noted at 24.34, continued cardiac diet and activity as tolerated advised.  6 month follow up scheduled or sooner if needed.  Refills sent per request.        Problems Addressed this Visit        Cardiovascular and Mediastinum    High blood pressure - Primary    Relevant Medications    valsartan (DIOVAN) 40 MG tablet    Other Relevant Orders    ECG 12 Lead    Hypercholesteremia    Relevant Medications    fenofibric acid (TRILIPIX) 135 MG capsule delayed-release delayed release capsule    lovastatin (MEVACOR) 40 MG tablet          Patient's Body mass index is 24.34 kg/m². BMI is within normal parameters. No follow-up required..              Electronically signed by ESPERANZA Jaramillo April 26, 2019 1:19 PM

## 2019-10-24 ENCOUNTER — TELEPHONE (OUTPATIENT)
Dept: CARDIOLOGY | Facility: CLINIC | Age: 54
End: 2019-10-24

## 2019-10-24 ENCOUNTER — OFFICE VISIT (OUTPATIENT)
Dept: CARDIOLOGY | Facility: CLINIC | Age: 54
End: 2019-10-24

## 2019-10-24 VITALS
HEART RATE: 68 BPM | WEIGHT: 169 LBS | BODY MASS INDEX: 25.61 KG/M2 | HEIGHT: 68 IN | SYSTOLIC BLOOD PRESSURE: 116 MMHG | DIASTOLIC BLOOD PRESSURE: 78 MMHG

## 2019-10-24 DIAGNOSIS — R25.2 MUSCLE CRAMP: ICD-10-CM

## 2019-10-24 DIAGNOSIS — E66.3 OVERWEIGHT: ICD-10-CM

## 2019-10-24 DIAGNOSIS — R01.1 HEART MURMUR: ICD-10-CM

## 2019-10-24 DIAGNOSIS — I10 ESSENTIAL HYPERTENSION: Primary | ICD-10-CM

## 2019-10-24 DIAGNOSIS — E78.00 HYPERCHOLESTEREMIA: ICD-10-CM

## 2019-10-24 PROCEDURE — 99213 OFFICE O/P EST LOW 20 MIN: CPT | Performed by: NURSE PRACTITIONER

## 2019-10-24 RX ORDER — VALSARTAN 40 MG/1
40 TABLET ORAL DAILY
Qty: 30 TABLET | Refills: 8 | Status: SHIPPED | OUTPATIENT
Start: 2019-10-24 | End: 2020-04-30 | Stop reason: SDUPTHER

## 2019-10-24 RX ORDER — PRAVASTATIN SODIUM 40 MG
40 TABLET ORAL DAILY
COMMUNITY
End: 2019-10-24 | Stop reason: SDUPTHER

## 2019-10-24 RX ORDER — FENOFIBRIC ACID 135 MG/1
135 CAPSULE, DELAYED RELEASE ORAL DAILY
Qty: 30 CAPSULE | Refills: 8 | Status: SHIPPED | OUTPATIENT
Start: 2019-10-24 | End: 2020-04-30 | Stop reason: SDUPTHER

## 2019-10-24 RX ORDER — PRAVASTATIN SODIUM 40 MG
40 TABLET ORAL DAILY
Qty: 30 TABLET | Refills: 8 | Status: SHIPPED | OUTPATIENT
Start: 2019-10-24 | End: 2020-04-30 | Stop reason: SDUPTHER

## 2019-10-24 NOTE — TELEPHONE ENCOUNTER
Please make sure he knows the krill oil will replace the fish oil.  Also, please let him know when he gets it to  COQ10 to help with joint pain/cramps.

## 2019-10-24 NOTE — PROGRESS NOTES
Chief Complaint   Patient presents with   • Follow-up     For cardiac management. Patient is on aspirin. Last lab work was done on 10/08/19, copy in door. Patient was changed from lovastatin to pravastatin per PCP. States that he has been having more leg cramps.    • Med Refill     Needs refills on cardiac medications, including pravastatin if he needs to stay on. 90 day supplies to [facility].       Cardiac Complaints  none      Subjective   Bhupendra Rothman is a 54 y.o. male with hypercholesterolemia, HTN,  and strong family history of ischemic heart disease. His stress test in 2015 showed mild septal ischemia and Imdur was added. In April of 2016, he reported more chest tightness with exertion. A repeat stress test did not show definite ischemia. His echocardiogram showed some apical septal hypokinesis and a normal ejection fraction. He had been statin intolerant but has tolerated Mevacor well. In December 2017, he called the office to report episode of increased blood pressure. Lisinopril was added.    He returns today for follow up and reports doing well. He denies chest pain, palpitations, dizziness, and fatigue. He does admit he had cholesterol medication change after most recent labs showed lipids not at goal. He thinks with the change he has had a bit more cramping but nothing has been limiting his ADL or activity. Most recent labs from PCP Oct 2019 show:  H/H 14.4/41.2, , BUN 11, Creatinine 1.0, K 4.4, , CA 9.7, AST 36, ALT 26, GFR 82, , TRIG 176, LDL 49, HDL 50. Refills of cardiac meds requested including pravastatin.          Cardiac History  Past Surgical History:   Procedure Laterality Date   • CARDIOVASCULAR STRESS TEST  11/16/2012    (Dr. GRANADOS) 5 min 54 sec. 90% THR. Negative   • CARDIOVASCULAR STRESS TEST  02/09/2015    8 min 59 sec, 87% THR, 150/80, septal ischemia, imdur   • CARDIOVASCULAR STRESS TEST  04/06/2016    8 min 31 sec, 164/80, 89% THR, no definite ischemia   • ECHO -  CONVERTED  2012    (Dr. GRANADOS) EF 65%. RVSP 64 mmHg   • ECHO - CONVERTED  2015    EF 55-60%, apical septal hypokinetic, mild MR, RVSP 43 mmHg   • ECHO - CONVERTED  2016    EF 55-60%, RVSP 23 mmHg, trace MR, apical septal wall hypokinetic       Current Outpatient Medications   Medication Sig Dispense Refill   • aspirin 81 MG EC tablet Take 81 mg by mouth daily.     • fenofibric acid (TRILIPIX) 135 MG capsule delayed-release delayed release capsule Take 1 capsule by mouth Daily. 30 capsule 8   • lansoprazole (PREVACID) 30 MG capsule Take 30 mg by mouth daily.     • Loratadine-Pseudoephedrine (CLARITIN-D 24 HOUR PO) Take 1 tablet by mouth Daily As Needed.     • Multiple Vitamins-Minerals (MULTIVITAMIN WITH MINERALS) tablet tablet Take 1 tablet by mouth Daily.     • nitroglycerin (NITROSTAT) 0.4 MG SL tablet 1 under the tongue as needed for angina, may repeat q5mins for up three doses 25 tablet 2   • Omega-3 Fatty Acids (FISH OIL) 1200 MG capsule capsule Take 1,200 mg by mouth 2 (two) times a day with meals.     • pravastatin (PRAVACHOL) 40 MG tablet Take 1 tablet by mouth Daily. 30 tablet 8   • valsartan (DIOVAN) 40 MG tablet Take 1 tablet by mouth Daily. 30 tablet 8     No current facility-administered medications for this visit.        Crestor [rosuvastatin calcium]    Past Medical History:   Diagnosis Date   • GERD (gastroesophageal reflux disease)    • Hypercholesterolemia    • Hyperlipidemia    • Inactive TB     as a child       Social History     Socioeconomic History   • Marital status:      Spouse name: Not on file   • Number of children: Not on file   • Years of education: Not on file   • Highest education level: Not on file   Tobacco Use   • Smoking status: Former Smoker     Packs/day: 1.50     Years: 30.00     Pack years: 45.00     Last attempt to quit:      Years since quittin.8   • Smokeless tobacco: Never Used   • Tobacco comment: Has not used e-cigarette for 6 months.  "  Substance and Sexual Activity   • Alcohol use: Yes     Comment: 3-4 beers/day   • Drug use: No   • Sexual activity: Defer       Family History   Problem Relation Age of Onset   • Heart disease Mother         PPM/ICD, CABG at age 67, stenting 2 years later   • Heart disease Father         valve replacement   • No Known Problems Sister        Review of Systems   Constitution: Negative for weakness and malaise/fatigue.   Cardiovascular: Negative for chest pain, claudication, dyspnea on exertion, irregular heartbeat, leg swelling, near-syncope, orthopnea, palpitations and syncope.   Respiratory: Negative for cough, shortness of breath and wheezing.    Musculoskeletal: Positive for muscle cramps and stiffness. Negative for back pain.   Gastrointestinal: Negative for anorexia, heartburn, nausea and vomiting.   Genitourinary: Negative for dysuria, hematuria, hesitancy and nocturia.   Neurological: Negative for dizziness, light-headedness and loss of balance.   Psychiatric/Behavioral: Negative for depression and memory loss. The patient is not nervous/anxious.            Objective     /78 (BP Location: Right arm)   Pulse 68   Ht 172.7 cm (67.99\")   Wt 76.7 kg (169 lb)   BMI 25.70 kg/m²     Physical Exam   Constitutional: He is oriented to person, place, and time. He appears well-developed and well-nourished.   HENT:   Head: Normocephalic and atraumatic.   Eyes: EOM are normal. Pupils are equal, round, and reactive to light.   Neck: Normal range of motion. Neck supple.   Cardiovascular: Normal rate and regular rhythm.   Murmur heard.  Pulmonary/Chest: Effort normal and breath sounds normal.   Abdominal: Soft.   Musculoskeletal: Normal range of motion.   Neurological: He is alert and oriented to person, place, and time.   Skin: Skin is warm and dry.   Psychiatric: He has a normal mood and affect. His behavior is normal.       Procedures    Assessment/Plan     HR is stable today with regular rhythm noted. HTN well " managed on current, same advised. Most recent labs show mixed hyperlipidemia not well managed for which medication changes were advised. He reports he is taking pravastatin but notices more leg aches, he will be urged to add COQ10 100mg OTC.  He is to have labs with you in December according to patient to assess efficacy of medication changes. Limited carbs, calories, and sugar recommended as well as well as decreasing his beer intake. No repeat cardiac workup advised as no new or worsening cardiac symptoms are reported. Refills of cardiac meds sent per request. BMI noted at 25.70, good cardiac diet with limited carbs, calories, and continued activity encouraged. 6 month follow up scheduled or sooner if needed.        Problems Addressed this Visit        Cardiovascular and Mediastinum    High blood pressure - Primary    Relevant Medications    valsartan (DIOVAN) 40 MG tablet    Hypercholesteremia    Relevant Medications    pravastatin (PRAVACHOL) 40 MG tablet    fenofibric acid (TRILIPIX) 135 MG capsule delayed-release delayed release capsule      Other Visit Diagnoses     Heart murmur        Muscle cramp        Overweight              Patient's Body mass index is 25.7 kg/m². BMI is above normal parameters. Recommendations include: nutrition counseling.                Electronically signed by ESPERANZA Jaramillo October 28, 2019 2:54 PM

## 2019-10-25 NOTE — TELEPHONE ENCOUNTER
Patient was made aware that the krill oil will replace the fish oil. Patient was also made aware to  COQ10 to see if it will help with the joint pain/cramps.

## 2020-04-30 ENCOUNTER — OFFICE VISIT (OUTPATIENT)
Dept: CARDIOLOGY | Facility: CLINIC | Age: 55
End: 2020-04-30

## 2020-04-30 VITALS
SYSTOLIC BLOOD PRESSURE: 110 MMHG | HEART RATE: 68 BPM | WEIGHT: 170.8 LBS | DIASTOLIC BLOOD PRESSURE: 70 MMHG | BODY MASS INDEX: 25.88 KG/M2 | HEIGHT: 68 IN | TEMPERATURE: 97.7 F

## 2020-04-30 DIAGNOSIS — E78.00 HYPERCHOLESTEREMIA: ICD-10-CM

## 2020-04-30 DIAGNOSIS — I10 ESSENTIAL HYPERTENSION: Primary | ICD-10-CM

## 2020-04-30 DIAGNOSIS — E66.3 OVERWEIGHT: ICD-10-CM

## 2020-04-30 PROCEDURE — 99213 OFFICE O/P EST LOW 20 MIN: CPT | Performed by: NURSE PRACTITIONER

## 2020-04-30 RX ORDER — PRAVASTATIN SODIUM 40 MG
40 TABLET ORAL DAILY
Qty: 30 TABLET | Refills: 8 | Status: SHIPPED | OUTPATIENT
Start: 2020-04-30 | End: 2020-05-04 | Stop reason: SDUPTHER

## 2020-04-30 RX ORDER — FENOFIBRIC ACID 135 MG/1
135 CAPSULE, DELAYED RELEASE ORAL DAILY
Qty: 30 CAPSULE | Refills: 8 | Status: SHIPPED | OUTPATIENT
Start: 2020-04-30 | End: 2020-05-04 | Stop reason: SDUPTHER

## 2020-04-30 RX ORDER — VALSARTAN 40 MG/1
40 TABLET ORAL DAILY
Qty: 30 TABLET | Refills: 8 | Status: SHIPPED | OUTPATIENT
Start: 2020-04-30 | End: 2020-11-10

## 2020-04-30 RX ORDER — LANSOPRAZOLE 30 MG/1
30 CAPSULE, DELAYED RELEASE ORAL DAILY
Qty: 90 CAPSULE | Refills: 2 | Status: SHIPPED | OUTPATIENT
Start: 2020-04-30 | End: 2021-01-26

## 2020-04-30 RX ORDER — LORATADINE 10 MG/1
10 TABLET ORAL DAILY PRN
COMMUNITY

## 2020-05-04 ENCOUNTER — TELEPHONE (OUTPATIENT)
Dept: CARDIOLOGY | Facility: CLINIC | Age: 55
End: 2020-05-04

## 2020-05-04 RX ORDER — PRAVASTATIN SODIUM 40 MG
40 TABLET ORAL DAILY
Qty: 90 TABLET | Refills: 3 | Status: SHIPPED | OUTPATIENT
Start: 2020-05-04 | End: 2020-11-12

## 2020-05-04 RX ORDER — FENOFIBRIC ACID 135 MG/1
135 CAPSULE, DELAYED RELEASE ORAL DAILY
Qty: 90 CAPSULE | Refills: 3 | Status: SHIPPED | OUTPATIENT
Start: 2020-05-04 | End: 2021-05-10 | Stop reason: SDUPTHER

## 2020-05-04 NOTE — TELEPHONE ENCOUNTER
Patient left , requesting the pravastatin and fenofibric acid scripts be changed to 90 day scripts.  New scripts sent to [facility].

## 2020-11-10 ENCOUNTER — OFFICE VISIT (OUTPATIENT)
Dept: CARDIOLOGY | Facility: CLINIC | Age: 55
End: 2020-11-10

## 2020-11-10 VITALS
HEART RATE: 70 BPM | SYSTOLIC BLOOD PRESSURE: 130 MMHG | WEIGHT: 169.2 LBS | BODY MASS INDEX: 25.64 KG/M2 | TEMPERATURE: 97.5 F | DIASTOLIC BLOOD PRESSURE: 72 MMHG | HEIGHT: 68 IN

## 2020-11-10 DIAGNOSIS — Z82.49 FAMILY HISTORY OF HEART DISEASE: ICD-10-CM

## 2020-11-10 DIAGNOSIS — E78.00 HYPERCHOLESTEREMIA: ICD-10-CM

## 2020-11-10 DIAGNOSIS — K21.9 GASTROESOPHAGEAL REFLUX DISEASE WITHOUT ESOPHAGITIS: ICD-10-CM

## 2020-11-10 DIAGNOSIS — I10 ESSENTIAL HYPERTENSION: Primary | ICD-10-CM

## 2020-11-10 DIAGNOSIS — Z79.899 MEDICATION MANAGEMENT: ICD-10-CM

## 2020-11-10 PROCEDURE — 99213 OFFICE O/P EST LOW 20 MIN: CPT | Performed by: NURSE PRACTITIONER

## 2020-11-10 RX ORDER — KRILL/OM-3/DHA/EPA/PHOSPHO/AST 500MG-86MG
1 CAPSULE ORAL DAILY
COMMUNITY
End: 2021-11-11 | Stop reason: ALTCHOICE

## 2020-11-10 NOTE — PATIENT INSTRUCTIONS
"Hypertension, Adult  High blood pressure (hypertension) is when the force of blood pumping through the arteries is too strong. The arteries are the blood vessels that carry blood from the heart throughout the body. Hypertension forces the heart to work harder to pump blood and may cause arteries to become narrow or stiff. Untreated or uncontrolled hypertension can cause a heart attack, heart failure, a stroke, kidney disease, and other problems.  A blood pressure reading consists of a higher number over a lower number. Ideally, your blood pressure should be below 120/80. The first (\"top\") number is called the systolic pressure. It is a measure of the pressure in your arteries as your heart beats. The second (\"bottom\") number is called the diastolic pressure. It is a measure of the pressure in your arteries as the heart relaxes.  What are the causes?  The exact cause of this condition is not known. There are some conditions that result in or are related to high blood pressure.  What increases the risk?  Some risk factors for high blood pressure are under your control. The following factors may make you more likely to develop this condition:  · Smoking.  · Having type 2 diabetes mellitus, high cholesterol, or both.  · Not getting enough exercise or physical activity.  · Being overweight.  · Having too much fat, sugar, calories, or salt (sodium) in your diet.  · Drinking too much alcohol.  Some risk factors for high blood pressure may be difficult or impossible to change. Some of these factors include:  · Having chronic kidney disease.  · Having a family history of high blood pressure.  · Age. Risk increases with age.  · Race. You may be at higher risk if you are .  · Gender. Men are at higher risk than women before age 45. After age 65, women are at higher risk than men.  · Having obstructive sleep apnea.  · Stress.  What are the signs or symptoms?  High blood pressure may not cause symptoms. Very high " blood pressure (hypertensive crisis) may cause:  · Headache.  · Anxiety.  · Shortness of breath.  · Nosebleed.  · Nausea and vomiting.  · Vision changes.  · Severe chest pain.  · Seizures.  How is this diagnosed?  This condition is diagnosed by measuring your blood pressure while you are seated, with your arm resting on a flat surface, your legs uncrossed, and your feet flat on the floor. The cuff of the blood pressure monitor will be placed directly against the skin of your upper arm at the level of your heart. It should be measured at least twice using the same arm. Certain conditions can cause a difference in blood pressure between your right and left arms.  Certain factors can cause blood pressure readings to be lower or higher than normal for a short period of time:  · When your blood pressure is higher when you are in a health care provider's office than when you are at home, this is called white coat hypertension. Most people with this condition do not need medicines.  · When your blood pressure is higher at home than when you are in a health care provider's office, this is called masked hypertension. Most people with this condition may need medicines to control blood pressure.  If you have a high blood pressure reading during one visit or you have normal blood pressure with other risk factors, you may be asked to:  · Return on a different day to have your blood pressure checked again.  · Monitor your blood pressure at home for 1 week or longer.  If you are diagnosed with hypertension, you may have other blood or imaging tests to help your health care provider understand your overall risk for other conditions.  How is this treated?  This condition is treated by making healthy lifestyle changes, such as eating healthy foods, exercising more, and reducing your alcohol intake. Your health care provider may prescribe medicine if lifestyle changes are not enough to get your blood pressure under control, and  if:  · Your systolic blood pressure is above 130.  · Your diastolic blood pressure is above 80.  Your personal target blood pressure may vary depending on your medical conditions, your age, and other factors.  Follow these instructions at home:  Eating and drinking    · Eat a diet that is high in fiber and potassium, and low in sodium, added sugar, and fat. An example eating plan is called the DASH (Dietary Approaches to Stop Hypertension) diet. To eat this way:  ? Eat plenty of fresh fruits and vegetables. Try to fill one half of your plate at each meal with fruits and vegetables.  ? Eat whole grains, such as whole-wheat pasta, brown rice, or whole-grain bread. Fill about one fourth of your plate with whole grains.  ? Eat or drink low-fat dairy products, such as skim milk or low-fat yogurt.  ? Avoid fatty cuts of meat, processed or cured meats, and poultry with skin. Fill about one fourth of your plate with lean proteins, such as fish, chicken without skin, beans, eggs, or tofu.  ? Avoid pre-made and processed foods. These tend to be higher in sodium, added sugar, and fat.  · Reduce your daily sodium intake. Most people with hypertension should eat less than 1,500 mg of sodium a day.  · Do not drink alcohol if:  ? Your health care provider tells you not to drink.  ? You are pregnant, may be pregnant, or are planning to become pregnant.  · If you drink alcohol:  ? Limit how much you use to:  § 0-1 drink a day for women.  § 0-2 drinks a day for men.  ? Be aware of how much alcohol is in your drink. In the [place] one drink equals one 12 oz bottle of beer (355 mL), one 5 oz glass of wine (148 mL), or one 1½ oz glass of hard liquor (44 mL).  Lifestyle    · Work with your health care provider to maintain a healthy body weight or to lose weight. Ask what an ideal weight is for you.  · Get at least 30 minutes of exercise most days of the week. Activities may include walking, swimming, or biking.  · Include exercise to  strengthen your muscles (resistance exercise), such as Pilates or lifting weights, as part of your weekly exercise routine. Try to do these types of exercises for 30 minutes at least 3 days a week.  · Do not use any products that contain nicotine or tobacco, such as cigarettes, e-cigarettes, and chewing tobacco. If you need help quitting, ask your health care provider.  · Monitor your blood pressure at home as told by your health care provider.  · Keep all follow-up visits as told by your health care provider. This is important.  Medicines  · Take over-the-counter and prescription medicines only as told by your health care provider. Follow directions carefully. Blood pressure medicines must be taken as prescribed.  · Do not skip doses of blood pressure medicine. Doing this puts you at risk for problems and can make the medicine less effective.  · Ask your health care provider about side effects or reactions to medicines that you should watch for.  Contact a health care provider if you:  · Think you are having a reaction to a medicine you are taking.  · Have headaches that keep coming back (recurring).  · Feel dizzy.  · Have swelling in your ankles.  · Have trouble with your vision.  Get help right away if you:  · Develop a severe headache or confusion.  · Have unusual weakness or numbness.  · Feel faint.  · Have severe pain in your chest or abdomen.  · Vomit repeatedly.  · Have trouble breathing.  Summary  · Hypertension is when the force of blood pumping through your arteries is too strong. If this condition is not controlled, it may put you at risk for serious complications.  · Your personal target blood pressure may vary depending on your medical conditions, your age, and other factors. For most people, a normal blood pressure is less than 120/80.  · Hypertension is treated with lifestyle changes, medicines, or a combination of both. Lifestyle changes include losing weight, eating a healthy, low-sodium diet,  "exercising more, and limiting alcohol.  This information is not intended to replace advice given to you by your health care provider. Make sure you discuss any questions you have with your health care provider.  Document Released: 12/18/2006 Document Revised: 08/28/2019 Document Reviewed: 08/28/2019  [employer] Patient Education © 2020 [employer]    DASH Eating Plan  DASH stands for \"Dietary Approaches to Stop Hypertension.\" The DASH eating plan is a healthy eating plan that has been shown to reduce high blood pressure (hypertension). It may also reduce your risk for type 2 diabetes, heart disease, and stroke. The DASH eating plan may also help with weight loss.  What are tips for following this plan?    General guidelines  · Avoid eating more than 2,300 mg (milligrams) of salt (sodium) a day. If you have hypertension, you may need to reduce your sodium intake to 1,500 mg a day.  · Limit alcohol intake to no more than 1 drink a day for nonpregnant women and 2 drinks a day for men. One drink equals 12 oz of beer, 5 oz of wine, or 1½ oz of hard liquor.  · Work with your health care provider to maintain a healthy body weight or to lose weight. Ask what an ideal weight is for you.  · Get at least 30 minutes of exercise that causes your heart to beat faster (aerobic exercise) most days of the week. Activities may include walking, swimming, or biking.  · Work with your health care provider or diet and nutrition specialist (dietitian) to adjust your eating plan to your individual calorie needs.  Reading food labels    · Check food labels for the amount of sodium per serving. Choose foods with less than 5 percent of the Daily Value of sodium. Generally, foods with less than 300 mg of sodium per serving fit into this eating plan.  · To find whole grains, look for the word \"whole\" as the first word in the ingredient list.  Shopping  · Buy products labeled as \"low-sodium\" or \"no salt added.\"  · Buy fresh foods. Avoid canned " foods and premade or frozen meals.  Cooking  · Avoid adding salt when cooking. Use salt-free seasonings or herbs instead of table salt or sea salt. Check with your health care provider or pharmacist before using salt substitutes.  · Do not neal foods. Cook foods using healthy methods such as baking, boiling, grilling, and broiling instead.  · Cook with heart-healthy oils, such as olive, canola, soybean, or sunflower oil.  Meal planning  · Eat a balanced diet that includes:  ? 5 or more servings of fruits and vegetables each day. At each meal, try to fill half of your plate with fruits and vegetables.  ? Up to 6-8 servings of whole grains each day.  ? Less than 6 oz of lean meat, poultry, or fish each day. A 3-oz serving of meat is about the same size as a deck of cards. One egg equals 1 oz.  ? 2 servings of low-fat dairy each day.  ? A serving of nuts, seeds, or beans 5 times each week.  ? Heart-healthy fats. Healthy fats called Omega-3 fatty acids are found in foods such as flaxseeds and coldwater fish, like sardines, salmon, and mackerel.  · Limit how much you eat of the following:  ? Canned or prepackaged foods.  ? Food that is high in trans fat, such as fried foods.  ? Food that is high in saturated fat, such as fatty meat.  ? Sweets, desserts, sugary drinks, and other foods with added sugar.  ? Full-fat dairy products.  · Do not salt foods before eating.  · Try to eat at least 2 vegetarian meals each week.  · Eat more home-cooked food and less restaurant, buffet, and fast food.  · When eating at a restaurant, ask that your food be prepared with less salt or no salt, if possible.  What foods are recommended?  The items listed may not be a complete list. Talk with your dietitian about what dietary choices are best for you.  Grains  Whole-grain or whole-wheat bread. Whole-grain or whole-wheat pasta. Brown rice. Oatmeal. Quinoa. Bulgur. Whole-grain and low-sodium cereals. Leena bread. Low-fat, low-sodium crackers.  Whole-wheat flour tortillas.  Vegetables  Fresh or frozen vegetables (raw, steamed, roasted, or grilled). Low-sodium or reduced-sodium tomato and vegetable juice. Low-sodium or reduced-sodium tomato sauce and tomato paste. Low-sodium or reduced-sodium canned vegetables.  Fruits  All fresh, dried, or frozen fruit. Canned fruit in natural juice (without added sugar).  Meat and other protein foods  Skinless chicken or turkey. Ground chicken or turkey. Pork with fat trimmed off. Fish and seafood. Egg whites. Dried beans, peas, or lentils. Unsalted nuts, nut butters, and seeds. Unsalted canned beans. Lean cuts of beef with fat trimmed off. Low-sodium, lean deli meat.  Dairy  Low-fat (1%) or fat-free (skim) milk. Fat-free, low-fat, or reduced-fat cheeses. Nonfat, low-sodium ricotta or cottage cheese. Low-fat or nonfat yogurt. Low-fat, low-sodium cheese.  Fats and oils  Soft margarine without trans fats. Vegetable oil. Low-fat, reduced-fat, or light mayonnaise and salad dressings (reduced-sodium). Canola, safflower, olive, soybean, and sunflower oils. Avocado.  Seasoning and other foods  Herbs. Spices. Seasoning mixes without salt. Unsalted popcorn and pretzels. Fat-free sweets.  What foods are not recommended?  The items listed may not be a complete list. Talk with your dietitian about what dietary choices are best for you.  Grains  Baked goods made with fat, such as croissants, muffins, or some breads. Dry pasta or rice meal packs.  Vegetables  Creamed or fried vegetables. Vegetables in a cheese sauce. Regular canned vegetables (not low-sodium or reduced-sodium). Regular canned tomato sauce and paste (not low-sodium or reduced-sodium). Regular tomato and vegetable juice (not low-sodium or reduced-sodium). Pickles. Olives.  Fruits  Canned fruit in a light or heavy syrup. Fried fruit. Fruit in cream or butter sauce.  Meat and other protein foods  Fatty cuts of meat. Ribs. Fried meat. Gage. Sausage. Bologna and other  processed lunch meats. Salami. Fatback. Hotdogs. Bratwurst. Salted nuts and seeds. Canned beans with added salt. Canned or smoked fish. Whole eggs or egg yolks. Chicken or turkey with skin.  Dairy  Whole or 2% milk, cream, and half-and-half. Whole or full-fat cream cheese. Whole-fat or sweetened yogurt. Full-fat cheese. Nondairy creamers. Whipped toppings. Processed cheese and cheese spreads.  Fats and oils  Butter. Stick margarine. Lard. Shortening. Ghee. Gage fat. Tropical oils, such as coconut, palm kernel, or palm oil.  Seasoning and other foods  Salted popcorn and pretzels. Onion salt, garlic salt, seasoned salt, table salt, and sea salt. Worcestershire sauce. Tartar sauce. Barbecue sauce. Teriyaki sauce. Soy sauce, including reduced-sodium. Steak sauce. Canned and packaged gravies. Fish sauce. Oyster sauce. Cocktail sauce. Horseradish that you find on the shelf. Ketchup. Mustard. Meat flavorings and tenderizers. Bouillon cubes. Hot sauce and Tabasco sauce. Premade or packaged marinades. Premade or packaged taco seasonings. Relishes. Regular salad dressings.  Where to find more information:  · National Heart, Lung, and [employer]: www.nhlbi.nih.gov  · American Heart Association: www.heart.org  Summary  · The DASH eating plan is a healthy eating plan that has been shown to reduce high blood pressure (hypertension). It may also reduce your risk for type 2 diabetes, heart disease, and stroke.  · With the DASH eating plan, you should limit salt (sodium) intake to 2,300 mg a day. If you have hypertension, you may need to reduce your sodium intake to 1,500 mg a day.  · When on the DASH eating plan, aim to eat more fresh fruits and vegetables, whole grains, lean proteins, low-fat dairy, and heart-healthy fats.  · Work with your health care provider or diet and nutrition specialist (dietitian) to adjust your eating plan to your individual calorie needs.  This information is not intended to replace advice given to  you by your health care provider. Make sure you discuss any questions you have with your health care provider.  Document Released: 12/06/2012 Document Revised: 11/30/2018 Document Reviewed: 12/11/2017  [employer] Patient Education © 2020 [employer]

## 2020-11-10 NOTE — PROGRESS NOTES
"Chief Complaint   Patient presents with   • Follow-up     Cardiac management . Has no cardiac complaints today   • Labs     Last labs  done May 2020 , he would like to  have labs redrawn at [facility]   • Med Refill     No refills needed today. Had med list today       Subjective       Bhupendra Rothman is a 55 y.o. male  with hypercholesterolemia, HTN,  and strong family history of ischemic heart disease. His stress test in 2015 showed mild septal ischemia and Imdur was added. In April of 2016, he reported more chest tightness with exertion. A repeat stress test did not show definite ischemia. His echocardiogram showed some apical septal hypokinesis and a normal ejection fraction.  In December 2017, he called the office to report episode of increased blood pressure. Lisinopril was added.  Later lisinopril was changed to valsartan.  Blood pressure improved and he later stopped valsartan.  May 2020 labs showed cholesterol not well controlled.  Statin was changed from Mevacor to Pravachol.  Krill oil added.    Today comes to the office for follow-up visit and no cardiac complaints are voiced.  He admits to a lot of physical work including lifting which has made him \"feel sore\" but no anginal symptoms noted.  At home his systolic blood pressure has been in the 130s and 140s but feels his monitor is not accurate.  He remains off antihypertensive medications at this time.       Cardiac History:    Past Surgical History:   Procedure Laterality Date   • CARDIOVASCULAR STRESS TEST  11/16/2012    (Dr. GRANADOS) 5 min 54 sec. 90% THR. Negative   • CARDIOVASCULAR STRESS TEST  02/09/2015    8 min 59 sec, 87% THR, 150/80, septal ischemia, imdur   • CARDIOVASCULAR STRESS TEST  04/06/2016    8 min 31 sec, 164/80, 89% THR, no definite ischemia   • ECHO - CONVERTED  11/14/2012    (Dr. GRANADOS) EF 65%. RVSP 64 mmHg   • ECHO - CONVERTED  02/09/2015    EF 55-60%, apical septal hypokinetic, mild MR, RVSP 43 mmHg   • ECHO - CONVERTED  04/06/2016    EF " 55-60%, RVSP 23 mmHg, trace MR, apical septal wall hypokinetic       Current Outpatient Medications   Medication Sig Dispense Refill   • aspirin 81 MG EC tablet Take 81 mg by mouth daily.     • Coenzyme Q10 (COQ10 PO) Take 1 capsule by mouth Daily.     • fenofibric acid (TRILIPIX) 135 MG capsule delayed-release delayed release capsule Take 1 capsule by mouth Daily. 90 capsule 3   • Krill Oil 500 MG capsule Take 1 capsule by mouth Daily.     • lansoprazole (PREVACID) 30 MG capsule Take 1 capsule by mouth Daily. 90 capsule 2   • loratadine (Claritin) 10 MG tablet Take 10 mg by mouth Daily As Needed for Allergies.     • Multiple Vitamins-Minerals (MULTIVITAMIN WITH MINERALS) tablet tablet Take 1 tablet by mouth Daily.     • nitroglycerin (NITROSTAT) 0.4 MG SL tablet 1 under the tongue as needed for angina, may repeat q5mins for up three doses 25 tablet 2   • pravastatin (PRAVACHOL) 40 MG tablet Take 1 tablet by mouth Daily. 90 tablet 3     No current facility-administered medications for this visit.        Crestor [rosuvastatin calcium]    Past Medical History:   Diagnosis Date   • GERD (gastroesophageal reflux disease)    • Hypercholesterolemia    • Hyperlipidemia    • Inactive TB     as a child       Social History     Socioeconomic History   • Marital status:      Spouse name: Not on file   • Number of children: Not on file   • Years of education: Not on file   • Highest education level: Not on file   Tobacco Use   • Smoking status: Former Smoker     Packs/day: 1.50     Years: 30.00     Pack years: 45.00     Quit date:      Years since quittin.8   • Smokeless tobacco: Never Used   • Tobacco comment: Has not used e-cigarette for 6 months.   Substance and Sexual Activity   • Alcohol use: Yes     Comment: 3-4 beers/day   • Drug use: No   • Sexual activity: Defer       Family History   Problem Relation Age of Onset   • Heart disease Mother         PPM/ICD, CABG at age 67, stenting 2 years later   • Heart  "disease Father         valve replacement   • No Known Problems Sister        Review of Systems   Constitution: Negative for decreased appetite, diaphoresis and malaise/fatigue.   HENT: Negative for nosebleeds.    Eyes: Negative for blurred vision.   Cardiovascular: Negative for chest pain, claudication, cyanosis, dyspnea on exertion, irregular heartbeat, leg swelling, near-syncope, orthopnea, palpitations, paroxysmal nocturnal dyspnea and syncope.   Respiratory: Negative for shortness of breath and sleep disturbances due to breathing.    Endocrine: Negative for cold intolerance and heat intolerance.   Hematologic/Lymphatic: Does not bruise/bleed easily.   Skin: Negative for rash.   Musculoskeletal: Negative for myalgias.   Gastrointestinal: Negative for heartburn, melena and nausea.   Genitourinary: Negative for dysuria and hematuria.   Neurological: Negative for dizziness and light-headedness.   Psychiatric/Behavioral: The patient does not have insomnia and is not nervous/anxious.         Objective      Labs 5/1/2020: RBC 4.51, hemoglobin 14.2, hematocrit 41.6, platelets 360, glucose 106, BUN 14, creatinine 0.6, sodium 137, potassium 4.4, chloride 102, calcium 9.5, total protein 7.4, albumin 4.5, AST 28, ALT 22, ALP 68, total bili 0.4, GFR 91, total cholesterol 233, triglycerides 146, HDL 51, , TSH 2.06    /72 (BP Location: Left arm)   Pulse 70   Temp 97.5 °F (36.4 °C)   Ht 172.7 cm (67.99\")   Wt 76.7 kg (169 lb 3.2 oz)   BMI 25.73 kg/m²     Vitals signs and nursing note reviewed.   Eyes:      Pupils: Pupils are equal, round, and reactive to light.   HENT:      Head: Normocephalic.   Neck:      Musculoskeletal: Normal range of motion.   Pulmonary:      Breath sounds: Normal breath sounds.   Cardiovascular:      Normal rate. Regular rhythm.   Edema:     Peripheral edema absent.   Abdominal:      General: Bowel sounds are normal.      Palpations: Abdomen is soft.   Musculoskeletal: Normal range of " motion.   Skin:     General: Skin is warm.   Neurological:      Mental Status: Alert and oriented to person, place, and time.          Procedures: none today       Problem List Items Addressed This Visit        Cardiovascular and Mediastinum    High blood pressure - Primary    Relevant Orders    CBC (No Diff)    Hypercholesteremia    Relevant Orders    Comprehensive Metabolic Panel    Lipid Panel       Digestive    GERD (gastroesophageal reflux disease)       Other    Family history of heart disease      Other Visit Diagnoses     Medication management        Relevant Orders    Comprehensive Metabolic Panel    CBC (No Diff)    Lipid Panel         Hypertension-blood pressure today is normal.  He plans to check his home blood pressure monitor for accuracy and continue to monitor.  If systolic blood pressure is consistently in the 140s then consider resuming antihypertensive medication.  He understands to call the office in regards.  DASH diet discussed and handout information given.    Hypercholesterolemia-currently on fenofibrate and Krill oil and pravastatin.  Lab order given to reassess lipid panel liver function test.  Further recommendations based on lab results.    Patient's Body mass index is 25.73 kg/m². BMI is slightly above normal parameters. Recommendations include: nutrition counseling.     Bhupendra Rothman  reports that he quit smoking about 7 years ago. He has a 45.00 pack-year smoking history. He has never used smokeless tobacco.  I complemented him on maintaining smoking cessation.    From a cardiac standpoint patient appears stable.  No cardiac testing warranted at this time.  We will see him back in 6 months.  Please call sooner for cardiac concerns.           Electronically signed by ESPERANZA Castillo,  November 10, 2020 13:46 EST

## 2020-11-12 RX ORDER — ROSUVASTATIN CALCIUM 20 MG/1
20 TABLET, COATED ORAL DAILY
Qty: 90 TABLET | Refills: 3 | Status: SHIPPED | OUTPATIENT
Start: 2020-11-12 | End: 2021-05-10 | Stop reason: SDUPTHER

## 2020-11-12 RX ORDER — ICOSAPENT ETHYL 1000 MG/1
2 CAPSULE ORAL 2 TIMES DAILY WITH MEALS
Qty: 120 CAPSULE | Refills: 3 | Status: SHIPPED | OUTPATIENT
Start: 2020-11-12 | End: 2021-05-10 | Stop reason: SDUPTHER

## 2020-11-16 ENCOUNTER — TELEPHONE (OUTPATIENT)
Dept: CARDIOLOGY | Facility: CLINIC | Age: 55
End: 2020-11-16

## 2020-11-16 NOTE — TELEPHONE ENCOUNTER
Patient called in said he did not  the Vascepa d/t cost $50/month .  He has asked if he needs to continue with Fenofibrate and Crestor together. He had read some precautions of them together .

## 2020-11-16 NOTE — TELEPHONE ENCOUNTER
In his case benefits appear to out weigh risk. I would advise to continue both fenofibrate and statin. Make sure monitor routine labs for medication safety.

## 2020-11-18 NOTE — TELEPHONE ENCOUNTER
PER THE PHARMACY THE GENERIC RX IS STILL GOING TO COST OVER $150.  IS THERE ANY OTHER OPTIONS THAT WOULD BE CONSIDERABLE FOR THIS PATIENT.

## 2020-11-20 NOTE — TELEPHONE ENCOUNTER
I spoke with pharmacist at [facility]  , patient is not going to fill Vascepa at this time d/t cost but is willing to start a Statin , he has already picked up a 90 day supply of Rosuvastatin from the pharmacy . They have cancelled prescription for Vascepa for now.

## 2021-01-26 RX ORDER — LANSOPRAZOLE 30 MG/1
CAPSULE, DELAYED RELEASE ORAL
Qty: 90 CAPSULE | Refills: 1 | Status: SHIPPED | OUTPATIENT
Start: 2021-01-26 | End: 2021-07-28

## 2021-05-10 ENCOUNTER — OFFICE VISIT (OUTPATIENT)
Dept: CARDIOLOGY | Facility: CLINIC | Age: 56
End: 2021-05-10

## 2021-05-10 VITALS
BODY MASS INDEX: 27.15 KG/M2 | RESPIRATION RATE: 16 BRPM | TEMPERATURE: 98.2 F | WEIGHT: 173 LBS | HEIGHT: 67 IN | HEART RATE: 76 BPM | SYSTOLIC BLOOD PRESSURE: 126 MMHG | DIASTOLIC BLOOD PRESSURE: 84 MMHG

## 2021-05-10 DIAGNOSIS — I10 ESSENTIAL HYPERTENSION: ICD-10-CM

## 2021-05-10 DIAGNOSIS — E78.00 HYPERCHOLESTEREMIA: Primary | ICD-10-CM

## 2021-05-10 DIAGNOSIS — E66.3 OVERWEIGHT (BMI 25.0-29.9): ICD-10-CM

## 2021-05-10 PROCEDURE — 99213 OFFICE O/P EST LOW 20 MIN: CPT | Performed by: NURSE PRACTITIONER

## 2021-05-10 RX ORDER — ROSUVASTATIN CALCIUM 20 MG/1
20 TABLET, COATED ORAL DAILY
Qty: 90 TABLET | Refills: 3 | Status: SHIPPED | OUTPATIENT
Start: 2021-05-10 | End: 2022-05-20

## 2021-05-10 RX ORDER — CHLORAL HYDRATE 500 MG
1000 CAPSULE ORAL
COMMUNITY

## 2021-05-10 RX ORDER — ICOSAPENT ETHYL 1000 MG/1
2 CAPSULE ORAL 2 TIMES DAILY WITH MEALS
Qty: 120 CAPSULE | Refills: 3 | Status: SHIPPED | OUTPATIENT
Start: 2021-05-10 | End: 2022-01-27

## 2021-05-10 RX ORDER — FENOFIBRIC ACID 135 MG/1
135 CAPSULE, DELAYED RELEASE ORAL DAILY
Qty: 90 CAPSULE | Refills: 3 | Status: SHIPPED | OUTPATIENT
Start: 2021-05-10 | End: 2022-05-24

## 2021-05-10 NOTE — PROGRESS NOTES
Chief Complaint   Patient presents with   • Follow-up     Cardiac management, asa therapy, labs done 11/2020, results in chart, no complaints today,    • Med Refill     Refills not needed today, pt did not bring list of medications with him today, confirmed medications verbally,        Cardiac Complaints  none      Subjective   Bhupendra Rothman is a 55 y.o. male with hypercholesterolemia, HTN,  and strong family history of ischemic heart disease. His stress test in 2015 showed mild septal ischemia and Imdur was added. In April of 2016, he reported more chest tightness with exertion. A repeat stress test did not show definite ischemia. His echocardiogram showed some apical septal hypokinesis and a normal ejection fraction.  In December 2017, he called the office to report episode of increased blood pressure. Lisinopril was added.  Later lisinopril was changed to valsartan.  Blood pressure improved and he later stopped valsartan.  May 2020 labs showed cholesterol not well controlled.  Statin was changed from Mevacor to Pravachol.  Krill oil added. He then later stopped his statin therapy and it was changed to crestor after his most recent labs from May 2020 showed:  HH 14.4/42.7, BUN 18, Creatinine 1.0, Na 137, K 4.5, AST 28, GFR 79, ALT 21, TRIG 248, HDL 49, .  He has continued on fenofibrate and krill oil.     He returns today for follow up and denies any new concerns. Chest pain, SOA, dizziness, syncope, and palpitations denied. Labs he admits have not been done since that time, order is requested. Refills not requested.      Cardiac History  Past Surgical History:   Procedure Laterality Date   • CARDIOVASCULAR STRESS TEST  11/16/2012    (Dr. GRANADOS) 5 min 54 sec. 90% THR. Negative   • CARDIOVASCULAR STRESS TEST  02/09/2015    8 min 59 sec, 87% THR, 150/80, septal ischemia, imdur   • CARDIOVASCULAR STRESS TEST  04/06/2016    8 min 31 sec, 164/80, 89% THR, no definite ischemia   • ECHO - CONVERTED  11/14/2012     (Dr. GRANADOS) EF 65%. RVSP 64 mmHg   • ECHO - CONVERTED  02/09/2015    EF 55-60%, apical septal hypokinetic, mild MR, RVSP 43 mmHg   • ECHO - CONVERTED  04/06/2016    EF 55-60%, RVSP 23 mmHg, trace MR, apical septal wall hypokinetic       Current Outpatient Medications   Medication Sig Dispense Refill   • aspirin 81 MG EC tablet Take 81 mg by mouth daily.     • Coenzyme Q10 (COQ10 PO) Take 1 capsule by mouth Daily.     • fenofibric acid (TRILIPIX) 135 MG capsule delayed-release delayed release capsule Take 1 capsule by mouth Daily. 90 capsule 3   • lansoprazole (PREVACID) 30 MG capsule TAKE ONE CAPSULE BY MOUTH DAILY 90 capsule 1   • loratadine (Claritin) 10 MG tablet Take 10 mg by mouth Daily As Needed for Allergies.     • Multiple Vitamins-Minerals (MULTIVITAMIN WITH MINERALS) tablet tablet Take 1 tablet by mouth Daily.     • nitroglycerin (NITROSTAT) 0.4 MG SL tablet 1 under the tongue as needed for angina, may repeat q5mins for up three doses 25 tablet 2   • Omega-3 Fatty Acids (fish oil) 1000 MG capsule capsule Take 1,000 mg by mouth Daily With Breakfast.     • rosuvastatin (CRESTOR) 20 MG tablet Take 1 tablet by mouth Daily. 90 tablet 3   • icosapent ethyl (Vascepa) 1 g capsule capsule Take 2 g by mouth 2 (Two) Times a Day With Meals. 120 capsule 3   • Krill Oil 500 MG capsule Take 1 capsule by mouth Daily.       No current facility-administered medications for this visit.       Patient has no known allergies.    Past Medical History:   Diagnosis Date   • GERD (gastroesophageal reflux disease)    • Hypercholesterolemia    • Hyperlipidemia    • Inactive TB     as a child       Social History     Socioeconomic History   • Marital status:      Spouse name: Not on file   • Number of children: Not on file   • Years of education: Not on file   • Highest education level: Not on file   Tobacco Use   • Smoking status: Former Smoker     Packs/day: 1.50     Years: 30.00     Pack years: 45.00     Quit date: 2013      "Years since quittin.3   • Smokeless tobacco: Never Used   • Tobacco comment: Has not used e-cigarette for 6 months.   Substance and Sexual Activity   • Alcohol use: Yes     Comment: 3-4 beers/day   • Drug use: No   • Sexual activity: Defer       Family History   Problem Relation Age of Onset   • Heart disease Mother         PPM/ICD, CABG at age 67, stenting 2 years later   • Heart disease Father         valve replacement   • No Known Problems Sister        Review of Systems   Constitutional: Negative for malaise/fatigue and night sweats.   Cardiovascular: Negative for chest pain, claudication, dyspnea on exertion, irregular heartbeat, leg swelling, near-syncope, orthopnea, palpitations and syncope.   Respiratory: Negative for cough, shortness of breath and wheezing.    Musculoskeletal: Positive for stiffness. Negative for back pain and joint pain.   Gastrointestinal: Negative for anorexia, heartburn, melena, nausea and vomiting.   Genitourinary: Negative for dysuria, hematuria, hesitancy and nocturia.   Neurological: Negative for dizziness, light-headedness and loss of balance.   Psychiatric/Behavioral: Negative for depression and memory loss. The patient is not nervous/anxious.            Objective     /84 (BP Location: Right arm, Patient Position: Sitting)   Pulse 76   Temp 98.2 °F (36.8 °C) (Temporal)   Resp 16   Ht 170.2 cm (67\")   Wt 78.5 kg (173 lb)   BMI 27.10 kg/m²     Constitutional:       Appearance: Healthy appearance. Not in distress.   Eyes:      Pupils: Pupils are equal, round, and reactive to light.   HENT:      Nose: Nose normal.   Pulmonary:      Effort: Pulmonary effort is normal.      Breath sounds: Normal breath sounds.   Cardiovascular:      PMI at left midclavicular line. Normal rate. Regular rhythm.      Murmurs: There is a systolic murmur.   Abdominal:      Palpations: Abdomen is soft.   Musculoskeletal: Normal range of motion.      Cervical back: Normal range of motion and " neck supple. Skin:     General: Skin is warm and dry.   Neurological:      Mental Status: Oriented to person, place and time.         Procedures    Assessment/Plan     HTN:  Blood pressure stable. No additional medication will be added in regards. Limited sodium advised.     Hyperlipidemia:  On crestor therapy for elevated LDL, vascepa/trilipix for elevated trigs, while also taking krill oil. Limited sugar/carbs advised. Order for FLP provided.     BMI noted at 27.10, good cardiac diet with limited carbs, calories, and activity as tolerated advised. Walking regimen recommended.     6 month follow up recommended or sooner if needed.     No refills needed.     Cardiac status:  Stable. No new concerns voiced. No repeat workup advised. He will remain on ASA as prophylactic therapy.  Will consider at next visit for length of time since last testing.           Problems Addressed this Visit        Cardiac and Vasculature    High blood pressure    Relevant Orders    CBC (No Diff)    Comprehensive Metabolic Panel    Lipid Panel    TSH    Hypercholesteremia - Primary    Relevant Medications    fenofibric acid (TRILIPIX) 135 MG capsule delayed-release delayed release capsule    icosapent ethyl (Vascepa) 1 g capsule capsule    rosuvastatin (CRESTOR) 20 MG tablet    Other Relevant Orders    CBC (No Diff)    Comprehensive Metabolic Panel    Lipid Panel    TSH      Other Visit Diagnoses     Overweight (BMI 25.0-29.9)          Diagnoses       Codes Comments    Hypercholesteremia    -  Primary ICD-10-CM: E78.00  ICD-9-CM: 272.0     Essential hypertension     ICD-10-CM: I10  ICD-9-CM: 401.9     Overweight (BMI 25.0-29.9)     ICD-10-CM: E66.3  ICD-9-CM: 278.02           Patient's Body mass index is 27.1 kg/m². overweight.  Good cardiac diet with limited carbs, calories, and walking advised             Electronically signed by ESPERANZA Jaramillo May 10, 2021 15:51 EDT

## 2021-05-11 NOTE — PROGRESS NOTES
LDL still high after crestor start as well as trigs, but improved with addition of crestor. Please let him know, he may increase crestor to 40mg M-Th, 20mg Fri-Sun.  K is okay but slightly high at 5.3, limit potassium rich food.

## 2021-07-28 RX ORDER — LANSOPRAZOLE 30 MG/1
CAPSULE, DELAYED RELEASE ORAL
Qty: 90 CAPSULE | Refills: 0 | Status: SHIPPED | OUTPATIENT
Start: 2021-07-28 | End: 2021-10-19

## 2021-10-19 RX ORDER — LANSOPRAZOLE 30 MG/1
CAPSULE, DELAYED RELEASE ORAL
Qty: 90 CAPSULE | Refills: 0 | Status: SHIPPED | OUTPATIENT
Start: 2021-10-19 | End: 2021-11-11 | Stop reason: SDUPTHER

## 2021-10-20 ENCOUNTER — TELEPHONE (OUTPATIENT)
Dept: CARDIOLOGY | Facility: CLINIC | Age: 56
End: 2021-10-20

## 2021-10-20 ENCOUNTER — PRIOR AUTHORIZATION (OUTPATIENT)
Dept: CARDIOLOGY | Facility: CLINIC | Age: 56
End: 2021-10-20

## 2021-10-20 DIAGNOSIS — E78.00 HYPERCHOLESTEREMIA: Primary | ICD-10-CM

## 2021-10-20 DIAGNOSIS — Z79.899 MEDICATION MANAGEMENT: ICD-10-CM

## 2021-10-20 DIAGNOSIS — I10 ESSENTIAL HYPERTENSION: ICD-10-CM

## 2021-10-20 NOTE — TELEPHONE ENCOUNTER
Patient request lab order to be sent to [facility], for labs prior to office visit here 11- .

## 2021-11-04 ENCOUNTER — TELEPHONE (OUTPATIENT)
Dept: CARDIOLOGY | Facility: CLINIC | Age: 56
End: 2021-11-04

## 2021-11-04 NOTE — TELEPHONE ENCOUNTER
----- Message from Tory Roque LPN sent at 11/3/2021  5:36 PM EDT -----    ----- Message -----  From: Juany Ham APRN  Sent: 11/3/2021   4:33 PM EDT  To: Lisa Acosta LPN    Labs are good. Glucose and triglycerides slightly increased. Continue current medications and diabetic type diet.

## 2021-11-11 ENCOUNTER — OFFICE VISIT (OUTPATIENT)
Dept: CARDIOLOGY | Facility: CLINIC | Age: 56
End: 2021-11-11

## 2021-11-11 VITALS
HEIGHT: 67 IN | DIASTOLIC BLOOD PRESSURE: 70 MMHG | HEART RATE: 72 BPM | BODY MASS INDEX: 27.53 KG/M2 | WEIGHT: 175.4 LBS | TEMPERATURE: 97.8 F | SYSTOLIC BLOOD PRESSURE: 118 MMHG

## 2021-11-11 DIAGNOSIS — I10 PRIMARY HYPERTENSION: Primary | ICD-10-CM

## 2021-11-11 DIAGNOSIS — E78.00 HYPERCHOLESTEREMIA: ICD-10-CM

## 2021-11-11 DIAGNOSIS — Z82.49 FAMILY HISTORY OF HEART DISEASE: ICD-10-CM

## 2021-11-11 DIAGNOSIS — K21.9 GASTROESOPHAGEAL REFLUX DISEASE WITHOUT ESOPHAGITIS: ICD-10-CM

## 2021-11-11 DIAGNOSIS — E78.1 HYPERTRIGLYCERIDEMIA: ICD-10-CM

## 2021-11-11 PROCEDURE — 99213 OFFICE O/P EST LOW 20 MIN: CPT | Performed by: NURSE PRACTITIONER

## 2021-11-11 RX ORDER — LANSOPRAZOLE 30 MG/1
30 CAPSULE, DELAYED RELEASE ORAL DAILY
Qty: 90 CAPSULE | Refills: 2 | Status: SHIPPED | OUTPATIENT
Start: 2021-11-11 | End: 2022-11-14 | Stop reason: SDUPTHER

## 2021-11-11 NOTE — PROGRESS NOTES
Chief Complaint   Patient presents with   • Follow-up     cardiac management . Has no cardiac complaints today.   • LABS     Results November 2021 on chart   • Med Refill     Needs refills on Prevacid  90 day supply to [facility]   • Shortness of Breath     With exertion       Subjective       Bhupendra Rothman is a 56 y.o. male  with hypercholesterolemia, HTN,  and strong family history of ischemic heart disease. His stress test in 2015 showed mild septal ischemia and Imdur was added. In April of 2016, he reported more chest tightness with exertion. A repeat stress test did not show definite ischemia. His echocardiogram showed some apical septal hypokinesis and a normal ejection fraction.  In December 2017, he called the office to report episode of increased blood pressure. Lisinopril was added.  Later lisinopril was changed to valsartan.  Blood pressure improved and he later stopped valsartan.  For lipids he tried different statins ultimately placed on Crestor.    Today comes the office for follow-up visit.  No cardiac symptoms or complaints voiced at this time.  He is maintaining normal daily activity.  No recent change in medications except Krill oil and co-Q10 discontinued.  He remains on Vascepa and Crestor.  Heartburn remains well controlled as long as he takes Prevacid.  When he does not take the medication he does develop symptoms.    Cardiac History:    Past Surgical History:   Procedure Laterality Date   • CARDIOVASCULAR STRESS TEST  11/16/2012    (Dr. GRANADOS) 5 min 54 sec. 90% THR. Negative   • CARDIOVASCULAR STRESS TEST  02/09/2015    8 min 59 sec, 87% THR, 150/80, septal ischemia, imdur   • CARDIOVASCULAR STRESS TEST  04/06/2016    8 min 31 sec, 164/80, 89% THR, no definite ischemia   • ECHO - CONVERTED  11/14/2012    (Dr. GRANADOS) EF 65%. RVSP 64 mmHg   • ECHO - CONVERTED  02/09/2015    EF 55-60%, apical septal hypokinetic, mild MR, RVSP 43 mmHg   • ECHO - CONVERTED  04/06/2016    EF 55-60%, RVSP 23 mmHg, trace  MR, apical septal wall hypokinetic       Current Outpatient Medications   Medication Sig Dispense Refill   • aspirin 81 MG EC tablet Take 81 mg by mouth daily.     • fenofibric acid (TRILIPIX) 135 MG capsule delayed-release delayed release capsule Take 1 capsule by mouth Daily. 90 capsule 3   • icosapent ethyl (Vascepa) 1 g capsule capsule Take 2 g by mouth 2 (Two) Times a Day With Meals. 120 capsule 3   • lansoprazole (PREVACID) 30 MG capsule Take 1 capsule by mouth Daily. 90 capsule 2   • loratadine (Claritin) 10 MG tablet Take 10 mg by mouth Daily As Needed for Allergies.     • Multiple Vitamins-Minerals (MULTIVITAMIN WITH MINERALS) tablet tablet Take 1 tablet by mouth Daily.     • nitroglycerin (NITROSTAT) 0.4 MG SL tablet 1 under the tongue as needed for angina, may repeat q5mins for up three doses 25 tablet 2   • Omega-3 Fatty Acids (fish oil) 1000 MG capsule capsule Take 1,000 mg by mouth Daily With Breakfast.     • rosuvastatin (CRESTOR) 20 MG tablet Take 1 tablet by mouth Daily. 90 tablet 3     No current facility-administered medications for this visit.       Patient has no known allergies.    Past Medical History:   Diagnosis Date   • GERD (gastroesophageal reflux disease)    • Hypercholesterolemia    • Hyperlipidemia    • Inactive TB     as a child       Social History     Socioeconomic History   • Marital status:    Tobacco Use   • Smoking status: Former Smoker     Packs/day: 1.50     Years: 30.00     Pack years: 45.00     Quit date:      Years since quittin.8   • Smokeless tobacco: Never Used   • Tobacco comment: Has not used e-cigarette for 6 months.   Vaping Use   • Vaping Use: Never used   Substance and Sexual Activity   • Alcohol use: Yes     Comment: 3-4 beers/day   • Drug use: No   • Sexual activity: Defer       Family History   Problem Relation Age of Onset   • Heart disease Mother         PPM/ICD, CABG at age 67, stenting 2 years later   • Heart disease Father         valve  "replacement   • No Known Problems Sister        Review of Systems   Constitutional: Negative for decreased appetite, diaphoresis and malaise/fatigue.   HENT: Negative for nosebleeds.    Eyes: Negative for blurred vision.   Cardiovascular: Negative for chest pain, claudication, cyanosis, dyspnea on exertion, irregular heartbeat, leg swelling, near-syncope, orthopnea, palpitations, paroxysmal nocturnal dyspnea and syncope.   Respiratory: Negative for shortness of breath.    Endocrine: Negative for cold intolerance and heat intolerance.   Hematologic/Lymphatic: Does not bruise/bleed easily.   Skin: Negative for rash.   Musculoskeletal: Negative for myalgias.   Gastrointestinal: Positive for heartburn (Only if misses a dose of Prevacid). Negative for melena and nausea.   Genitourinary: Negative for dysuria and hematuria.   Neurological: Negative for dizziness and light-headedness.   Psychiatric/Behavioral: Negative for memory loss. The patient does not have insomnia and is not nervous/anxious.         BP Readings from Last 5 Encounters:   11/11/21 118/70   05/10/21 126/84   11/10/20 130/72   04/30/20 110/70   10/24/19 116/78       Wt Readings from Last 5 Encounters:   11/11/21 79.6 kg (175 lb 6.4 oz)   05/10/21 78.5 kg (173 lb)   11/10/20 76.7 kg (169 lb 3.2 oz)   04/30/20 77.5 kg (170 lb 12.8 oz)   10/24/19 76.7 kg (169 lb)       Objective        Labs 11/4/2021: WBC 8.4, RBC 4.66, hemoglobin 14.9, hematocrit 42.8, platelets are 157, glucose 107, BUN 13, creatinine 0.9, sodium 141, potassium 4.2, chloride 105, CO2 22, calcium 9.8, total protein 8.2, albumin 4.8, AST 43, ALP 77, total bili 0.6, GFR 96, total cholesterol 190, triglycerides 214, HDL 41, ,    /70 (BP Location: Left arm)   Pulse 72   Temp 97.8 °F (36.6 °C)   Ht 170.2 cm (67\")   Wt 79.6 kg (175 lb 6.4 oz)   BMI 27.47 kg/m²     Vitals and nursing note reviewed.   Eyes:      Pupils: Pupils are equal, round, and reactive to light.   HENT:      " Head: Normocephalic.   Neck:      Vascular: No carotid bruit or JVD.   Pulmonary:      Breath sounds: Normal breath sounds.   Cardiovascular:      Normal rate. Regular rhythm.   Pulses:     Intact distal pulses.   Edema:     Peripheral edema absent.   Abdominal:      General: Bowel sounds are normal.      Palpations: Abdomen is soft.   Musculoskeletal: Normal range of motion.      Cervical back: Normal range of motion. Skin:     General: Skin is warm.   Neurological:      Mental Status: Alert and oriented to person, place, and time.          Procedures: none today          Assessment/Plan   Diagnoses and all orders for this visit:    1. Primary hypertension (Primary)    2. Hypercholesteremia    3. Hypertriglyceridemia    4. Family history of heart disease    5. Gastroesophageal reflux disease without esophagitis    Other orders  -     lansoprazole (PREVACID) 30 MG capsule; Take 1 capsule by mouth Daily.  Dispense: 90 capsule; Refill: 2      Hypertension-blood pressure normal.  Continue to monitor.  DASH diet.    Hypercholesterolemia/hypertriglyceridemia-recent lipid panel reviewed.   and triglycerides 214.  Samples of Vascepa given.  Continue Crestor 20 mg daily.  Continue diet management.  If LDL significantly increases then consider increasing Crestor to 40 and/or adding C PSK 9 inhibitor.    GERD-well managed with Prevacid.  Refills given.  Dietary management of GERD disease also discussed.    Patient's Body mass index is 27.47 kg/m². indicating that he is overweight (BMI 25-29.9). Obesity-related health conditions include the following: dyslipidemias. Obesity is unchanged. BMI is is above average; BMI management plan is completed. We discussed portion control and increasing exercise..     I complemented him on maintaining smoking cessation.     Patient appears stable from a cardiac standpoint.  We will see him back in a year.  Please call sooner for any cardiac concerns.             Electronically signed  by Juany Ham, APRN,  November 11, 2021 15:16 EST

## 2022-01-27 RX ORDER — ICOSAPENT ETHYL 1000 MG/1
CAPSULE ORAL
Qty: 120 CAPSULE | Refills: 3 | Status: SHIPPED | OUTPATIENT
Start: 2022-01-27

## 2022-05-20 RX ORDER — ROSUVASTATIN CALCIUM 20 MG/1
TABLET, COATED ORAL
Qty: 90 TABLET | Refills: 3 | Status: SHIPPED | OUTPATIENT
Start: 2022-05-20 | End: 2022-11-14

## 2022-05-24 RX ORDER — FENOFIBRIC ACID 135 MG/1
CAPSULE, DELAYED RELEASE ORAL
Qty: 90 CAPSULE | Refills: 3 | Status: SHIPPED | OUTPATIENT
Start: 2022-05-24 | End: 2022-11-14 | Stop reason: SDUPTHER

## 2022-10-26 DIAGNOSIS — I10 PRIMARY HYPERTENSION: Primary | ICD-10-CM

## 2022-10-26 DIAGNOSIS — E78.1 HYPERTRIGLYCERIDEMIA: ICD-10-CM

## 2022-10-26 DIAGNOSIS — E78.00 HYPERCHOLESTEREMIA: ICD-10-CM

## 2022-10-26 DIAGNOSIS — Z79.899 MEDICATION MANAGEMENT: ICD-10-CM

## 2022-11-14 ENCOUNTER — OFFICE VISIT (OUTPATIENT)
Dept: CARDIOLOGY | Facility: CLINIC | Age: 57
End: 2022-11-14

## 2022-11-14 VITALS
WEIGHT: 170.8 LBS | BODY MASS INDEX: 26.81 KG/M2 | DIASTOLIC BLOOD PRESSURE: 70 MMHG | HEART RATE: 74 BPM | HEIGHT: 67 IN | SYSTOLIC BLOOD PRESSURE: 124 MMHG

## 2022-11-14 DIAGNOSIS — E78.1 HYPERTRIGLYCERIDEMIA: ICD-10-CM

## 2022-11-14 DIAGNOSIS — Z82.49 FAMILY HISTORY OF HEART DISEASE: ICD-10-CM

## 2022-11-14 DIAGNOSIS — K21.9 GASTROESOPHAGEAL REFLUX DISEASE WITHOUT ESOPHAGITIS: ICD-10-CM

## 2022-11-14 DIAGNOSIS — M79.10 MYALGIA: ICD-10-CM

## 2022-11-14 DIAGNOSIS — I10 PRIMARY HYPERTENSION: Primary | ICD-10-CM

## 2022-11-14 DIAGNOSIS — E78.00 HYPERCHOLESTEREMIA: ICD-10-CM

## 2022-11-14 PROCEDURE — 99214 OFFICE O/P EST MOD 30 MIN: CPT | Performed by: NURSE PRACTITIONER

## 2022-11-14 RX ORDER — LANSOPRAZOLE 30 MG/1
30 CAPSULE, DELAYED RELEASE ORAL DAILY
Qty: 90 CAPSULE | Refills: 3 | Status: SHIPPED | OUTPATIENT
Start: 2022-11-14

## 2022-11-14 RX ORDER — FENOFIBRIC ACID 135 MG/1
135 CAPSULE, DELAYED RELEASE ORAL DAILY
Qty: 90 CAPSULE | Refills: 3 | Status: SHIPPED | OUTPATIENT
Start: 2022-11-14

## 2022-11-14 NOTE — PROGRESS NOTES
Chief Complaint   Patient presents with   • Follow-up     Cardiac management. Has no cardiac complaints today   • LABS     October 2022 results on chart , patient aware of results   • Med Refill     Needs refills on Prevacid, Crestor and Triplix. 90 day supply to [facility]        Subjective       Bhupendra Rothman is a 57 y.o. male  with hypercholesterolemia, HTN,  and strong family history of ischemic heart disease. His stress test in 2015 showed mild septal ischemia and Imdur was added. In April of 2016, he reported more chest tightness with exertion. A repeat stress test did not show definite ischemia. His echocardiogram showed some apical septal hypokinesis and a normal ejection fraction.  In December 2017, he called the office to report episode of increased blood pressure. Lisinopril was added.  Later lisinopril was changed to valsartan.  Blood pressure improved and he later stopped valsartan.  For lipids he tried different statins ultimately placed on Crestor.    Today returns the office for follow-up visit.  He denies chest pain, palpitations, or inappropriate shortness of breath.  He maintains active lifestyle.  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  With Crestor his lipids have been better controlled however he has noticed increasing myalgia.  He asked if he could try a different statin to see if symptoms would improve.    Cardiac History:    Past Surgical History:   Procedure Laterality Date   • CARDIOVASCULAR STRESS TEST  11/16/2012    (Dr. GRANADOS) 5 min 54 sec. 90% THR. Negative   • CARDIOVASCULAR STRESS TEST  02/09/2015    8 min 59 sec, 87% THR, 150/80, septal ischemia, imdur   • CARDIOVASCULAR STRESS TEST  04/06/2016    8 min 31 sec, 164/80, 89% THR, no definite ischemia   • ECHO - CONVERTED  11/14/2012    (Dr. GRANADOS) EF 65%. RVSP 64 mmHg   • ECHO - CONVERTED  02/09/2015    EF 55-60%, apical septal hypokinetic,  mild MR, RVSP 43 mmHg   • ECHO - CONVERTED  2016    EF 55-60%, RVSP 23 mmHg, trace MR, apical septal wall hypokinetic       Current Outpatient Medications   Medication Sig Dispense Refill   • aspirin 81 MG EC tablet Take 81 mg by mouth daily.     • fenofibric acid (TRILIPIX) 135 MG capsule delayed-release delayed release capsule Take 1 capsule by mouth Daily. 90 capsule 3   • lansoprazole (PREVACID) 30 MG capsule Take 1 capsule by mouth Daily. 90 capsule 3   • loratadine (CLARITIN) 10 MG tablet Take 10 mg by mouth Daily As Needed for Allergies.     • METAMUCIL FIBER PO Take  by mouth 3 (Three) Times a Week.     • Multiple Vitamins-Minerals (MULTIVITAMIN WITH MINERALS) tablet tablet Take 1 tablet by mouth Daily.     • nitroglycerin (NITROSTAT) 0.4 MG SL tablet 1 under the tongue as needed for angina, may repeat q5mins for up three doses 25 tablet 2   • Omega-3 Fatty Acids (fish oil) 1000 MG capsule capsule Take 1,000 mg by mouth Daily With Breakfast.     • Vascepa 1 g capsule capsule TAKE TWO CAPSULES BY MOUTH TWICE A DAY WITH MEALS 120 capsule 3   • Pitavastatin Calcium 4 MG tablet Take 1 tablet by mouth Every Night. 30 tablet 1     No current facility-administered medications for this visit.       Patient has no known allergies.    Past Medical History:   Diagnosis Date   • GERD (gastroesophageal reflux disease)    • Hypercholesterolemia    • Hyperlipidemia    • Inactive TB     as a child       Social History     Socioeconomic History   • Marital status:    Tobacco Use   • Smoking status: Former     Packs/day: 1.50     Years: 30.00     Pack years: 45.00     Types: Cigarettes     Quit date:      Years since quittin.8   • Smokeless tobacco: Never   • Tobacco comments:     Has not used e-cigarette for 6 months.   Vaping Use   • Vaping Use: Never used   Substance and Sexual Activity   • Alcohol use: Yes     Comment: 3-4 beers/day   • Drug use: No   • Sexual activity: Defer       Family History  "  Problem Relation Age of Onset   • Heart disease Mother         PPM/ICD, CABG at age 67, stenting 2 years later   • Heart disease Father         valve replacement   • No Known Problems Sister        Review of Systems   Constitutional: Negative for decreased appetite, diaphoresis and malaise/fatigue.   HENT: Negative for nosebleeds.    Eyes: Negative for blurred vision.   Cardiovascular: Negative for chest pain, claudication, cyanosis, dyspnea on exertion, irregular heartbeat, leg swelling, near-syncope, orthopnea, palpitations, paroxysmal nocturnal dyspnea and syncope.   Respiratory: Negative for shortness of breath.    Endocrine: Negative for cold intolerance and heat intolerance.   Hematologic/Lymphatic: Does not bruise/bleed easily.   Skin: Negative for rash.   Musculoskeletal: Positive for myalgias. Negative for muscle cramps.   Gastrointestinal: Negative for heartburn, melena and nausea.   Genitourinary: Negative for dysuria and hematuria.   Neurological: Negative for dizziness and light-headedness.   Psychiatric/Behavioral: The patient does not have insomnia and is not nervous/anxious.         BP Readings from Last 5 Encounters:   11/14/22 124/70   11/11/21 118/70   05/10/21 126/84   11/10/20 130/72   04/30/20 110/70       Wt Readings from Last 5 Encounters:   11/14/22 77.5 kg (170 lb 12.8 oz)   11/11/21 79.6 kg (175 lb 6.4 oz)   05/10/21 78.5 kg (173 lb)   11/10/20 76.7 kg (169 lb 3.2 oz)   04/30/20 77.5 kg (170 lb 12.8 oz)       Objective      Labs 11/4/2021: WBC 8.4, RBC 4.66, hemoglobin 14.9, hematocrit 42.8, platelets are 157, glucose 107, BUN 13, creatinine 0.9, sodium 141, potassium 4.2, chloride 105, CO2 22, calcium 9.8, total protein 8.2, albumin 4.8, AST 43, ALP 77, total bili 0.6, GFR 96, total cholesterol 190, triglycerides 214, HDL 41, ,    /70 (BP Location: Left arm, Patient Position: Sitting)   Pulse 74   Ht 170.2 cm (67\")   Wt 77.5 kg (170 lb 12.8 oz)   BMI 26.75 kg/m² "     Vitals and nursing note reviewed.   Constitutional:       Appearance: Healthy appearance. Not in distress.   Eyes:      Conjunctiva/sclera: Conjunctivae normal.      Pupils: Pupils are equal, round, and reactive to light.   HENT:      Head: Normocephalic.   Neck:      Vascular: No carotid bruit.   Pulmonary:      Effort: Pulmonary effort is normal.      Breath sounds: Normal breath sounds.   Cardiovascular:      PMI at left midclavicular line. Normal rate. Regular rhythm.      Murmurs: There is no murmur.   Pulses:     Intact distal pulses.   Edema:     Peripheral edema absent.   Abdominal:      General: Bowel sounds are normal.      Palpations: Abdomen is soft.   Musculoskeletal: Normal range of motion.      Cervical back: Normal range of motion and neck supple. Skin:     General: Skin is warm and dry.   Neurological:      Mental Status: Alert, oriented to person, place, and time and oriented to person, place and time.          Procedures: none today          Assessment & Plan   Diagnoses and all orders for this visit:    1. Primary hypertension (Primary)    2. Hypercholesteremia  -     Pitavastatin Calcium 4 MG tablet; Take 1 tablet by mouth Every Night.  Dispense: 30 tablet; Refill: 1    3. Myalgia    4. Hypertriglyceridemia  -     fenofibric acid (TRILIPIX) 135 MG capsule delayed-release delayed release capsule; Take 1 capsule by mouth Daily.  Dispense: 90 capsule; Refill: 3    5. Gastroesophageal reflux disease without esophagitis  -     lansoprazole (PREVACID) 30 MG capsule; Take 1 capsule by mouth Daily.  Dispense: 90 capsule; Refill: 3    6. Family history of heart disease      Patient presents for his annual exam and no cardiac symptoms or concerns voiced at this time.  His blood pressure, heart rate and rhythm are normal.  Recent labs shows lipids controlled with Crestor at 20 mg daily.  However, he has started developing myalgia similar to what he experienced after taking Lipitor.  At this time we  will try changing to a different statin in the form of Livalo 4 mg daily.  He can start out taking 1/2 tablet daily and if tolerates well then increase to full tablet daily.  We discussed co-Q10 and maintaining normal vitamin D levels to help prevent side effects of statin therapy.  He will continue omega-3 fatty acids and Vascepa and Trilipix.  Of note, if he becomes intolerant to more statin therapy we will consider PCSK9 inhibitor.  Informational handouts on Repatha and Praluent provided.    For GERD management he is on Prevacid and tries to follow GERD diet for management.    BMI is 26.7.  I encouraged him on maintaining healthy diet and goal BMI around 25 as well as routine low impact cardio activity for primary prevention of CAD.    No repeat cardiac testing warranted at this time as patient remains asymptomatic.  We will continue with annual visits.  Please call sooner for cardiac concerns.

## 2023-06-19 DIAGNOSIS — E78.00 HYPERCHOLESTEREMIA: ICD-10-CM

## 2023-06-19 NOTE — TELEPHONE ENCOUNTER
Caller: RothmanBhupendra fulton    Relationship: Self    Best call back number: [phone]  Requested Prescriptions:   Requested Prescriptions     Pending Prescriptions Disp Refills   • Pitavastatin Calcium 4 MG tablet 30 tablet 1     Sig: Take 1 tablet by mouth Every Night.        Pharmacy where request should be sent: [facility] [id number] [address] [phone] [facility] [phone] [facility]     Last office visit with prescribing clinician: 11/14/2022   Last telemedicine visit with prescribing clinician: Visit date not found   Next office visit with prescribing clinician: 11/14/2023     Additional details provided by patient: PT STATES HE HAS ONE DAY LEFT OF HIS MEDICATION.   Does the patient have less than a 3 day supply:  [x] Yes  [] No    Would you like a call back once the refill request has been completed: [x] Yes [] No    If the office needs to give you a call back, can they leave a voicemail: [x] Yes [] No    Rocio Simeon   06/19/23 12:54 EDT

## 2023-09-11 DIAGNOSIS — E78.00 HYPERCHOLESTEREMIA: ICD-10-CM

## 2023-09-11 NOTE — TELEPHONE ENCOUNTER
Patient requesting a 90 day script for his Livalo to [facility].  His upcoming follow up is 11/14/2023.

## 2023-10-16 RX ORDER — ICOSAPENT ETHYL 1000 MG/1
CAPSULE ORAL
Qty: 120 CAPSULE | Refills: 3 | Status: SHIPPED | OUTPATIENT
Start: 2023-10-16

## 2023-11-09 ENCOUNTER — TELEPHONE (OUTPATIENT)
Dept: CARDIOLOGY | Facility: CLINIC | Age: 58
End: 2023-11-09
Payer: COMMERCIAL

## 2023-11-09 DIAGNOSIS — Z79.899 MEDICATION MANAGEMENT: ICD-10-CM

## 2023-11-09 DIAGNOSIS — E78.00 HYPERCHOLESTEREMIA: Primary | ICD-10-CM

## 2023-11-09 DIAGNOSIS — I10 PRIMARY HYPERTENSION: ICD-10-CM

## 2023-11-09 DIAGNOSIS — E78.1 HYPERTRIGLYCERIDEMIA: ICD-10-CM

## 2023-11-09 DIAGNOSIS — Z12.5 PROSTATE CANCER SCREENING: ICD-10-CM

## 2023-11-09 NOTE — TELEPHONE ENCOUNTER
Patient called, he has follow up appointment 11/14/23, he would like to  external lab orders to do labs prior to visit so you will have for his follow up.  He also ask if you can also order PSA for screening.

## 2023-11-14 ENCOUNTER — OFFICE VISIT (OUTPATIENT)
Dept: CARDIOLOGY | Facility: CLINIC | Age: 58
End: 2023-11-14
Payer: COMMERCIAL

## 2023-11-14 VITALS
BODY MASS INDEX: 26.48 KG/M2 | SYSTOLIC BLOOD PRESSURE: 112 MMHG | DIASTOLIC BLOOD PRESSURE: 60 MMHG | HEIGHT: 67 IN | HEART RATE: 82 BPM | WEIGHT: 168.7 LBS

## 2023-11-14 DIAGNOSIS — E78.00 HYPERCHOLESTEREMIA: Primary | ICD-10-CM

## 2023-11-14 DIAGNOSIS — E78.1 HYPERTRIGLYCERIDEMIA: ICD-10-CM

## 2023-11-14 DIAGNOSIS — I10 PRIMARY HYPERTENSION: ICD-10-CM

## 2023-11-14 DIAGNOSIS — Z82.49 FAMILY HISTORY OF HEART DISEASE: ICD-10-CM

## 2023-11-14 DIAGNOSIS — K21.9 GASTROESOPHAGEAL REFLUX DISEASE WITHOUT ESOPHAGITIS: ICD-10-CM

## 2023-11-14 PROCEDURE — 99214 OFFICE O/P EST MOD 30 MIN: CPT | Performed by: NURSE PRACTITIONER

## 2023-11-14 RX ORDER — ICOSAPENT ETHYL 1000 MG/1
2 CAPSULE ORAL 2 TIMES DAILY WITH MEALS
Qty: 120 CAPSULE | Refills: 11 | Status: SHIPPED | OUTPATIENT
Start: 2023-11-14

## 2023-11-14 RX ORDER — FENOFIBRIC ACID 135 MG/1
135 CAPSULE, DELAYED RELEASE ORAL DAILY
Qty: 90 CAPSULE | Refills: 3 | Status: SHIPPED | OUTPATIENT
Start: 2023-11-14

## 2023-11-14 RX ORDER — LANSOPRAZOLE 30 MG/1
30 CAPSULE, DELAYED RELEASE ORAL DAILY
Qty: 90 CAPSULE | Refills: 3 | Status: SHIPPED | OUTPATIENT
Start: 2023-11-14

## 2023-11-14 NOTE — PROGRESS NOTES
Chief Complaint   Patient presents with    Follow-up     Cardiac management. Had an episode of pain at right neck , happened again next day . This was 2-3 weeks ago and has not happened since    LABS     November 2023 results on chart . He would like to review results    Med Refill     Needs refills on Prevacid, Trilipix, Livalo and Vascepa 90 day supply to [facility]       Bhupendra Rothman is a 58 y.o. male with hypercholesterolemia, hypertension, and strong family history IHD.  Stress test 2015 showed septal ischemia, Imdur added.  Repeat stress test 2016 did not show definite ischemia.  Echocardiogram showed apical septal hypokinesis with normal EF.  For lipid management he was tried on different statins, most recent Livalo.  Omega-3 fatty acids, Vascepa, and Trilipix continued.    Today he returns to the office for follow-up visit.  Chest pain, palpitations, edema or inappropriate shortness of breath denied.  Currently has been treated for right ear infection.  He is also having issues with right retina, following with specialist.  Blood pressure has remained stable.    Cardiac History:    Past Surgical History:   Procedure Laterality Date    CARDIOVASCULAR STRESS TEST  11/16/2012    (Dr. GRANADOS) 5 min 54 sec. 90% THR. Negative    CARDIOVASCULAR STRESS TEST  02/09/2015    8 min 59 sec, 87% THR, 150/80, septal ischemia, imdur    CARDIOVASCULAR STRESS TEST  04/06/2016    8 min 31 sec, 164/80, 89% THR, no definite ischemia    ECHO - CONVERTED  11/14/2012    (Dr. GRANADOS) EF 65%. RVSP 64 mmHg    ECHO - CONVERTED  02/09/2015    EF 55-60%, apical septal hypokinetic, mild MR, RVSP 43 mmHg    ECHO - CONVERTED  04/06/2016    EF 55-60%, RVSP 23 mmHg, trace MR, apical septal wall hypokinetic       Current Outpatient Medications   Medication Sig Dispense Refill    aspirin 81 MG EC tablet Take 1 tablet by mouth Daily.      fenofibric acid (TRILIPIX) 135 MG capsule delayed-release delayed release capsule Take 1 capsule  by mouth Daily. 90 capsule 3    icosapent ethyl (Vascepa) 1 g capsule capsule Take 2 g by mouth 2 (Two) Times a Day With Meals. 120 capsule 11    lansoprazole (PREVACID) 30 MG capsule Take 1 capsule by mouth Daily. 90 capsule 3    loratadine (CLARITIN) 10 MG tablet Take 1 tablet by mouth Daily As Needed for Allergies.      METAMUCIL FIBER PO Take  by mouth 3 (Three) Times a Week.      Multiple Vitamins-Minerals (MULTIVITAMIN WITH MINERALS) tablet tablet Take 1 tablet by mouth Daily.      nitroglycerin (NITROSTAT) 0.4 MG SL tablet 1 under the tongue as needed for angina, may repeat q5mins for up three doses 25 tablet 2    Omega-3 Fatty Acids (fish oil) 1000 MG capsule capsule Take 1 capsule by mouth Daily With Breakfast.      Pitavastatin Calcium 4 MG tablet Take 1 tablet by mouth Every Night. 90 tablet 3     No current facility-administered medications for this visit.       Patient has no known allergies.    Past Medical History:   Diagnosis Date    GERD (gastroesophageal reflux disease)     Hypercholesterolemia     Hyperlipidemia     Inactive TB     as a child       Social History     Socioeconomic History    Marital status:    Tobacco Use    Smoking status: Former     Packs/day: 1.50     Years: 30.00     Additional pack years: 0.00     Total pack years: 45.00     Types: Cigarettes     Quit date: 2013     Years since quitting: 10.8    Smokeless tobacco: Never    Tobacco comments:     Has not used e-cigarette for 6 months.   Vaping Use    Vaping Use: Never used   Substance and Sexual Activity    Alcohol use: Yes     Comment: 3-4 beers/day    Drug use: No    Sexual activity: Defer       Family History   Problem Relation Age of Onset    Heart disease Mother         PPM/ICD, CABG at age 67, stenting 2 years later    Heart disease Father         valve replacement    No Known Problems Sister        Review of Systems   Constitutional: Negative for malaise/fatigue.   HENT:  Positive for ear pain. Negative for  "nosebleeds.    Eyes:  Positive for visual disturbance.   Cardiovascular:  Negative for chest pain, dyspnea on exertion, leg swelling, near-syncope and palpitations.   Respiratory:  Negative for shortness of breath and sleep disturbances due to breathing.    Endocrine: Negative for polydipsia, polyphagia and polyuria.   Hematologic/Lymphatic: Negative for bleeding problem.   Skin:  Negative for color change.   Musculoskeletal:  Positive for joint pain. Negative for muscle cramps and myalgias.   Gastrointestinal: Negative.    Genitourinary: Negative.    Neurological:  Negative for dizziness, numbness and weakness.        BP Readings from Last 5 Encounters:   11/14/23 112/60   11/14/22 124/70   11/11/21 118/70   05/10/21 126/84   11/10/20 130/72       Wt Readings from Last 5 Encounters:   11/14/23 76.5 kg (168 lb 11.2 oz)   11/14/22 77.5 kg (170 lb 12.8 oz)   11/11/21 79.6 kg (175 lb 6.4 oz)   05/10/21 78.5 kg (173 lb)   11/10/20 76.7 kg (169 lb 3.2 oz)       Objective     Labs 11/10/2023: WBC 6.7, RBC 4.61, H&H 1442, platelets 338, glucose 96, BUN 13, creatinine 1.1, sodium 142, potassium 5, CO2 25, calcium 9.3, total protein 8, albumin 4.7, AST 28, ALP 57, total bili 0.6, GFR 81, total cholesterol 226, triglycerides 226, HDL 44, , PSA 0.46    10/27/2022 total cholesterol 193, triglycerides 159, HDL 49, , A1c 5.8    /60 (BP Location: Left arm, Patient Position: Sitting)   Pulse 82   Ht 170.2 cm (67\")   Wt 76.5 kg (168 lb 11.2 oz)   BMI 26.42 kg/m²     Vitals and nursing note reviewed.   Constitutional:       Appearance: Healthy appearance. Not in distress.   Eyes:      Conjunctiva/sclera: Conjunctivae normal.      Pupils: Pupils are equal, round, and reactive to light.   HENT:      Head: Normocephalic.   Pulmonary:      Effort: Pulmonary effort is normal.      Breath sounds: Normal breath sounds.   Cardiovascular:      PMI at left midclavicular line. Normal rate. Regular rhythm.   Edema:     " Peripheral edema absent.   Abdominal:      General: Bowel sounds are normal.      Palpations: Abdomen is soft.   Musculoskeletal: Normal range of motion.      Cervical back: Normal range of motion and neck supple. Skin:     General: Skin is warm and dry.   Neurological:      Mental Status: Alert, oriented to person, place, and time and oriented to person, place and time.          Procedures: None today         Assessment & Plan   Diagnoses and all orders for this visit:    1. Hypercholesteremia (Primary)  -     Pitavastatin Calcium 4 MG tablet; Take 1 tablet by mouth Every Night.  Dispense: 90 tablet; Refill: 3    2. Hypertriglyceridemia  -     fenofibric acid (TRILIPIX) 135 MG capsule delayed-release delayed release capsule; Take 1 capsule by mouth Daily.  Dispense: 90 capsule; Refill: 3  -     icosapent ethyl (Vascepa) 1 g capsule capsule; Take 2 g by mouth 2 (Two) Times a Day With Meals.  Dispense: 120 capsule; Refill: 11    3. Family history of heart disease    4. Gastroesophageal reflux disease without esophagitis  -     lansoprazole (PREVACID) 30 MG capsule; Take 1 capsule by mouth Daily.  Dispense: 90 capsule; Refill: 3    5. Primary hypertension    Hypercholesterolemia/hypertriglyceridemia  -Continue Trilipix, Vascepa, Livalo  -Recent lab results reviewed.  LDL increased from 108 and 132  -Discussed referral to lipid clinic, consider leqvio.  Informational handout on medication management provided.  -Continue diet, exercise    Family history heart disease  -Due to cardiac risk related to hypercholesterolemia and family history recommend continuing low-dose daily aspirin, side effects denied    GERD  -Continue Prevacid  -GERD diet  -Continue routine labs including renal function  -Consider vitamin B12 level next lab order    Hypertension  -Currently well managed with conservative measures  -Continue to monitor    Annual follow-up visit scheduled.  Please call sooner for cardiac concerns.

## 2023-11-19 DIAGNOSIS — E78.1 HYPERTRIGLYCERIDEMIA: ICD-10-CM

## 2024-11-14 ENCOUNTER — OFFICE VISIT (OUTPATIENT)
Dept: CARDIOLOGY | Facility: CLINIC | Age: 59
End: 2024-11-14
Payer: COMMERCIAL

## 2024-11-14 VITALS
DIASTOLIC BLOOD PRESSURE: 76 MMHG | WEIGHT: 172 LBS | HEIGHT: 67 IN | BODY MASS INDEX: 27 KG/M2 | SYSTOLIC BLOOD PRESSURE: 128 MMHG | HEART RATE: 76 BPM

## 2024-11-14 DIAGNOSIS — E78.1 HYPERTRIGLYCERIDEMIA: ICD-10-CM

## 2024-11-14 DIAGNOSIS — Z82.49 FAMILY HISTORY OF HEART DISEASE: ICD-10-CM

## 2024-11-14 DIAGNOSIS — I10 PRIMARY HYPERTENSION: ICD-10-CM

## 2024-11-14 DIAGNOSIS — E78.2 MIXED HYPERLIPIDEMIA: Primary | ICD-10-CM

## 2024-11-14 DIAGNOSIS — E78.00 HYPERCHOLESTEREMIA: ICD-10-CM

## 2024-11-14 DIAGNOSIS — K21.9 GASTROESOPHAGEAL REFLUX DISEASE WITHOUT ESOPHAGITIS: ICD-10-CM

## 2024-11-14 PROCEDURE — 99214 OFFICE O/P EST MOD 30 MIN: CPT | Performed by: NURSE PRACTITIONER

## 2024-11-14 RX ORDER — FENOFIBRIC ACID 135 MG/1
135 CAPSULE, DELAYED RELEASE ORAL DAILY
Qty: 90 CAPSULE | Refills: 3 | Status: SHIPPED | OUTPATIENT
Start: 2024-11-14

## 2024-11-14 RX ORDER — ICOSAPENT ETHYL 1 G/1
2 CAPSULE ORAL 2 TIMES DAILY WITH MEALS
Qty: 120 CAPSULE | Refills: 11 | Status: SHIPPED | OUTPATIENT
Start: 2024-11-14

## 2024-11-14 RX ORDER — NITROGLYCERIN 0.4 MG/1
TABLET SUBLINGUAL
Qty: 25 TABLET | Refills: 2 | Status: SHIPPED | OUTPATIENT
Start: 2024-11-14

## 2024-11-14 RX ORDER — PITAVASTATIN CALCIUM 4.18 MG/1
4 TABLET, FILM COATED ORAL NIGHTLY
Qty: 90 TABLET | Refills: 3 | Status: SHIPPED | OUTPATIENT
Start: 2024-11-14

## 2024-11-14 RX ORDER — FENOFIBRIC ACID 135 MG/1
135 CAPSULE, DELAYED RELEASE ORAL DAILY
Qty: 90 CAPSULE | Refills: 3 | OUTPATIENT
Start: 2024-11-14

## 2024-11-14 NOTE — PROGRESS NOTES
Chief Complaint   Patient presents with    Follow-up     For cardiac management,     Med Refill     Refills needed on cardiac meds, 90 days to  Nitro SL tabs  to [facility]     Labs   in chart.        Subjective       Bhupendra Rothman is a 59 y.o. male with hypercholesterolemia, hypertension, and strong family history IHD. Stress test 2015 showed septal ischemia, Imdur added. Repeat stress test 2016 did not show definite ischemia. Echocardiogram showed apical septal hypokinesis with normal EF. For lipid management he was tried on different statins, most recent Livalo. Omega-3 fatty acids, Vascepa, and Trilipix continued.     Today he returns to the office for a follow-up visit.  He denies episodes of chest pain, palpitations, inappropriate shortness of breath, dizziness or edema.  He does mention neck discomfort he associated with lifting and muscle tension.  No recent change medication management noted.    Cardiac History:    Past Surgical History:   Procedure Laterality Date    CARDIOVASCULAR STRESS TEST  11/16/2012    (Dr. GRANADOS) 5 min 54 sec. 90% THR. Negative    CARDIOVASCULAR STRESS TEST  02/09/2015    8 min 59 sec, 87% THR, 150/80, septal ischemia, imdur    CARDIOVASCULAR STRESS TEST  04/06/2016    8 min 31 sec, 164/80, 89% THR, no definite ischemia    ECHO - CONVERTED  11/14/2012    (Dr. GRANADOS) EF 65%. RVSP 64 mmHg    ECHO - CONVERTED  02/09/2015    EF 55-60%, apical septal hypokinetic, mild MR, RVSP 43 mmHg    ECHO - CONVERTED  04/06/2016    EF 55-60%, RVSP 23 mmHg, trace MR, apical septal wall hypokinetic       Current Outpatient Medications   Medication Sig Dispense Refill    aspirin 81 MG EC tablet Take 1 tablet by mouth Daily.      fenofibric acid (TRILIPIX) 135 MG capsule delayed-release delayed release capsule Take 1 capsule by mouth Daily. 90 capsule 3    icosapent ethyl (Vascepa) 1 g capsule capsule Take 2 g by mouth 2 (Two) Times a Day With Meals. 120 capsule 11    lansoprazole (PREVACID)  30 MG capsule Take 1 capsule by mouth Daily. 90 capsule 3    loratadine (CLARITIN) 10 MG tablet Take 1 tablet by mouth Daily As Needed for Allergies.      METAMUCIL FIBER PO Take  by mouth 3 (Three) Times a Week.      Multiple Vitamins-Minerals (MULTIVITAMIN WITH MINERALS) tablet tablet Take 1 tablet by mouth Daily.      nitroglycerin (NITROSTAT) 0.4 MG SL tablet 1 under the tongue as needed for angina, may repeat q5mins for up three doses 25 tablet 2    Pitavastatin Calcium 4 MG tablet Take 1 tablet by mouth Every Night. 90 tablet 3     No current facility-administered medications for this visit.       Patient has no known allergies.    Past Medical History:   Diagnosis Date    GERD (gastroesophageal reflux disease)     Hypercholesterolemia     Hyperlipidemia     Inactive TB     as a child       Social History     Socioeconomic History    Marital status:    Tobacco Use    Smoking status: Former     Current packs/day: 0.00     Average packs/day: 1.5 packs/day for 30.0 years (45.0 ttl pk-yrs)     Types: Cigarettes     Start date:      Quit date:      Years since quittin.8    Smokeless tobacco: Never    Tobacco comments:     Has not used e-cigarette for 6 months.   Vaping Use    Vaping status: Never Used   Substance and Sexual Activity    Alcohol use: Yes     Comment: 3-4 beers/day    Drug use: No    Sexual activity: Defer       Family History   Problem Relation Age of Onset    Heart disease Mother         PPM/ICD, CABG at age 67, stenting 2 years later    Heart disease Father         valve replacement    No Known Problems Sister        Review of Systems   Cardiovascular:  Negative for chest pain, dyspnea on exertion, leg swelling, near-syncope and palpitations.   Respiratory:  Negative for shortness of breath.    Hematologic/Lymphatic: Negative for bleeding problem.   Skin:  Negative for color change.   Neurological:  Negative for dizziness, headaches and weakness.        BP Readings from Last 5  "Encounters:   11/14/24 128/76   11/14/23 112/60   11/14/22 124/70   11/11/21 118/70   05/10/21 126/84       Wt Readings from Last 5 Encounters:   11/14/24 78 kg (172 lb)   11/14/23 76.5 kg (168 lb 11.2 oz)   11/14/22 77.5 kg (170 lb 12.8 oz)   11/11/21 79.6 kg (175 lb 6.4 oz)   05/10/21 78.5 kg (173 lb)       Objective     Labs 10/23/2024: WBC 6.2, RBC 4.51, H&H 14 1 and 40.6, platelets 336, glucose 106, BUN 13, creatinine 0.8, sodium 139, potassium 4.1, chloride 106, CO2 22, calcium 9.7, AST 26, ALP 71, total bili 0.7, , , triglycerides 185, HDL 45, , A1c 5.7, PSA 0.58, A1C 5.7    Labs 11/4/2021: WBC 8.4, RBC 4.66, hemoglobin 14.9, hematocrit 42.8, platelets are 157, glucose 107, BUN 13, creatinine 0.9, sodium 141, potassium 4.2, chloride 105, CO2 22, calcium 9.8, total protein 8.2, albumin 4.8, AST 43, ALP 77, total bili 0.6, GFR 96, total cholesterol 190, triglycerides 214, HDL 41,     /76 (BP Location: Left arm, Patient Position: Sitting, Cuff Size: Adult)   Pulse 76   Ht 170.2 cm (67\")   Wt 78 kg (172 lb)   BMI 26.94 kg/m²     Vitals and nursing note reviewed.   Constitutional:       Appearance: Healthy appearance. Not in distress.   Eyes:      Conjunctiva/sclera: Conjunctivae normal.   HENT:      Head: Normocephalic.   Neck:      Thyroid: No thyromegaly.      Vascular: No carotid bruit.   Pulmonary:      Effort: Pulmonary effort is normal.      Breath sounds: Normal breath sounds.   Cardiovascular:      PMI at left midclavicular line. Normal rate. Regular rhythm.      Murmurs: There is no murmur.   Edema:     Peripheral edema absent.   Abdominal:      General: Bowel sounds are normal.      Palpations: Abdomen is soft.   Musculoskeletal:      Cervical back: Normal range of motion and neck supple. No muscular tenderness. Skin:     General: Skin is warm and dry.   Neurological:      Mental Status: Alert, oriented to person, place, and time and oriented to person, place and " time.          Procedures: None today         Assessment & Plan   Diagnoses and all orders for this visit:    1. Mixed hyperlipidemia (Primary)    2. Hypercholesteremia  -     Pitavastatin Calcium 4 MG tablet; Take 1 tablet by mouth Every Night.  Dispense: 90 tablet; Refill: 3    3. Hypertriglyceridemia  -     icosapent ethyl (Vascepa) 1 g capsule capsule; Take 2 g by mouth 2 (Two) Times a Day With Meals.  Dispense: 120 capsule; Refill: 11  -     fenofibric acid (TRILIPIX) 135 MG capsule delayed-release delayed release capsule; Take 1 capsule by mouth Daily.  Dispense: 90 capsule; Refill: 3    4. Primary hypertension    5. Family history of heart disease  -     nitroglycerin (NITROSTAT) 0.4 MG SL tablet; 1 under the tongue as needed for angina, may repeat q5mins for up three doses  Dispense: 25 tablet; Refill: 2    6. Gastroesophageal reflux disease without esophagitis        Hypercholesterolemia/hypertriglyceridemia  -Continue Trilipix, Vascepa, Livalo  -Recent lab results reviewed.  LDL remains elevated at 131, triglycerides 185, HDL good at 45  -Discussed referral to lipid clinic and consider PCSK9 inhibitor.  Informational handout on Repatha provided.  -Continue diet, exercise     Family history heart disease  -Due to cardiac risk related to hypercholesterolemia and family history recommend continuing low-dose daily aspirin, side effects denied     GERD  -Continue Prevacid  -GERD diet     Hypertension  -Currently well managed with conservative measures  -Continue to monitor     Annual follow-up visit scheduled.  Please call sooner for cardiac concerns.              Electronically signed by ESPERANZA Castillo,  November 14, 2024 15:49 EST    Dictated Utilizing Dragon Dictation: Part of this note may be an electronic transcription/translation of spoken language to printed text using the Dragon Dictation System.